# Patient Record
Sex: FEMALE | Race: WHITE | URBAN - NONMETROPOLITAN AREA
[De-identification: names, ages, dates, MRNs, and addresses within clinical notes are randomized per-mention and may not be internally consistent; named-entity substitution may affect disease eponyms.]

---

## 2016-12-08 LAB
CHOLESTEROL, TOTAL: 143 MG/DL
CHOLESTEROL/HDL RATIO: 1.9
GLUCOSE BLD-MCNC: 80 MG/DL
HDLC SERPL-MCNC: 74 MG/DL (ref 35–70)
LDL CHOLESTEROL CALCULATED: 62.2 MG/DL (ref 0–160)
TRIGL SERPL-MCNC: 34 MG/DL
VLDLC SERPL CALC-MCNC: 7 MG/DL

## 2017-05-31 VITALS
DIASTOLIC BLOOD PRESSURE: 68 MMHG | WEIGHT: 125 LBS | HEART RATE: 80 BPM | BODY MASS INDEX: 24.54 KG/M2 | HEIGHT: 60 IN | SYSTOLIC BLOOD PRESSURE: 104 MMHG

## 2017-05-31 DIAGNOSIS — L40.9 PSORIASIS: ICD-10-CM

## 2017-05-31 DIAGNOSIS — N94.3 PMS (PREMENSTRUAL SYNDROME): ICD-10-CM

## 2017-05-31 RX ORDER — FLUOXETINE 10 MG/1
10 CAPSULE ORAL DAILY
COMMUNITY
End: 2017-06-02

## 2017-06-02 ENCOUNTER — OFFICE VISIT (OUTPATIENT)
Dept: FAMILY MEDICINE CLINIC | Age: 42
End: 2017-06-02
Payer: COMMERCIAL

## 2017-06-02 VITALS
BODY MASS INDEX: 24.35 KG/M2 | WEIGHT: 124 LBS | HEART RATE: 80 BPM | SYSTOLIC BLOOD PRESSURE: 104 MMHG | DIASTOLIC BLOOD PRESSURE: 70 MMHG | HEIGHT: 60 IN

## 2017-06-02 DIAGNOSIS — L50.8 URTICARIA, ACUTE: Primary | ICD-10-CM

## 2017-06-02 PROCEDURE — G8420 CALC BMI NORM PARAMETERS: HCPCS | Performed by: FAMILY MEDICINE

## 2017-06-02 PROCEDURE — 99213 OFFICE O/P EST LOW 20 MIN: CPT | Performed by: FAMILY MEDICINE

## 2017-06-02 PROCEDURE — G8427 DOCREV CUR MEDS BY ELIG CLIN: HCPCS | Performed by: FAMILY MEDICINE

## 2017-06-02 PROCEDURE — 1036F TOBACCO NON-USER: CPT | Performed by: FAMILY MEDICINE

## 2017-06-02 RX ORDER — CETIRIZINE HYDROCHLORIDE 10 MG/1
10 TABLET ORAL DAILY
Qty: 30 TABLET | Refills: 5 | Status: SHIPPED | OUTPATIENT
Start: 2017-06-02 | End: 2018-07-13 | Stop reason: SDUPTHER

## 2017-06-02 ASSESSMENT — ENCOUNTER SYMPTOMS
SHORTNESS OF BREATH: 0
EYE PAIN: 0
RHINORRHEA: 0
COUGH: 0

## 2017-06-02 ASSESSMENT — PATIENT HEALTH QUESTIONNAIRE - PHQ9
1. LITTLE INTEREST OR PLEASURE IN DOING THINGS: 1
SUM OF ALL RESPONSES TO PHQ QUESTIONS 1-9: 2
2. FEELING DOWN, DEPRESSED OR HOPELESS: 1
SUM OF ALL RESPONSES TO PHQ9 QUESTIONS 1 & 2: 2

## 2018-02-13 ENCOUNTER — TELEPHONE (OUTPATIENT)
Dept: FAMILY MEDICINE CLINIC | Age: 43
End: 2018-02-13

## 2018-02-15 NOTE — TELEPHONE ENCOUNTER
Does not need a pap test and other health maintanence is up to date. Can do a simple well visit but if she is doing well can delay until next year.

## 2018-07-13 ENCOUNTER — OFFICE VISIT (OUTPATIENT)
Dept: FAMILY MEDICINE CLINIC | Age: 43
End: 2018-07-13
Payer: COMMERCIAL

## 2018-07-13 VITALS
HEART RATE: 79 BPM | SYSTOLIC BLOOD PRESSURE: 120 MMHG | DIASTOLIC BLOOD PRESSURE: 74 MMHG | BODY MASS INDEX: 24.04 KG/M2 | WEIGHT: 122.44 LBS | OXYGEN SATURATION: 98 % | HEIGHT: 60 IN

## 2018-07-13 DIAGNOSIS — H93.12 TINNITUS OF LEFT EAR: ICD-10-CM

## 2018-07-13 DIAGNOSIS — I73.00 RAYNAUD'S PHENOMENON WITHOUT GANGRENE: ICD-10-CM

## 2018-07-13 DIAGNOSIS — N92.0 MENORRHAGIA WITH REGULAR CYCLE: Primary | ICD-10-CM

## 2018-07-13 DIAGNOSIS — J30.89 CHRONIC NONSEASONAL ALLERGIC RHINITIS DUE TO POLLEN: ICD-10-CM

## 2018-07-13 PROCEDURE — 1036F TOBACCO NON-USER: CPT | Performed by: FAMILY MEDICINE

## 2018-07-13 PROCEDURE — G8427 DOCREV CUR MEDS BY ELIG CLIN: HCPCS | Performed by: FAMILY MEDICINE

## 2018-07-13 PROCEDURE — G8420 CALC BMI NORM PARAMETERS: HCPCS | Performed by: FAMILY MEDICINE

## 2018-07-13 PROCEDURE — 99214 OFFICE O/P EST MOD 30 MIN: CPT | Performed by: FAMILY MEDICINE

## 2018-07-13 RX ORDER — CETIRIZINE HYDROCHLORIDE 10 MG/1
10 TABLET ORAL DAILY
Qty: 90 TABLET | Refills: 3 | Status: SHIPPED | OUTPATIENT
Start: 2018-07-13 | End: 2020-01-21 | Stop reason: SDUPTHER

## 2018-07-13 RX ORDER — NAPROXEN 500 MG/1
500 TABLET ORAL 2 TIMES DAILY WITH MEALS
Qty: 60 TABLET | Refills: 5 | Status: SHIPPED | OUTPATIENT
Start: 2018-07-13 | End: 2020-01-21 | Stop reason: SDUPTHER

## 2018-07-13 ASSESSMENT — ENCOUNTER SYMPTOMS
NAUSEA: 0
ABDOMINAL PAIN: 1
CONSTIPATION: 0
DIARRHEA: 0
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
## 2019-06-05 ENCOUNTER — OFFICE VISIT (OUTPATIENT)
Dept: FAMILY MEDICINE CLINIC | Age: 44
End: 2019-06-05
Payer: COMMERCIAL

## 2019-06-05 VITALS
HEART RATE: 78 BPM | WEIGHT: 119 LBS | SYSTOLIC BLOOD PRESSURE: 128 MMHG | BODY MASS INDEX: 23.44 KG/M2 | DIASTOLIC BLOOD PRESSURE: 86 MMHG

## 2019-06-05 DIAGNOSIS — R00.2 HEART PALPITATIONS: ICD-10-CM

## 2019-06-05 DIAGNOSIS — R00.2 PALPITATIONS: Primary | ICD-10-CM

## 2019-06-05 DIAGNOSIS — F41.1 ANXIOUS REACTION: ICD-10-CM

## 2019-06-05 LAB — TSH REFLEX FT4: 4.12 MIU/ML (ref 0.49–4.6)

## 2019-06-05 PROCEDURE — 93000 ELECTROCARDIOGRAM COMPLETE: CPT | Performed by: FAMILY MEDICINE

## 2019-06-05 PROCEDURE — 99214 OFFICE O/P EST MOD 30 MIN: CPT | Performed by: FAMILY MEDICINE

## 2019-06-05 ASSESSMENT — PATIENT HEALTH QUESTIONNAIRE - PHQ9
SUM OF ALL RESPONSES TO PHQ QUESTIONS 1-9: 0
1. LITTLE INTEREST OR PLEASURE IN DOING THINGS: 0
2. FEELING DOWN, DEPRESSED OR HOPELESS: 0
SUM OF ALL RESPONSES TO PHQ9 QUESTIONS 1 & 2: 0
SUM OF ALL RESPONSES TO PHQ QUESTIONS 1-9: 0

## 2019-06-05 NOTE — PROGRESS NOTES
[address] [address] [address]  Dept: [phone]  Dept AOB:[phone]    Jon Ching is a 37 y.o. female who presents today for her medical conditions/complaints as notedbelow. Jon Ching is c/o of Other (not sleeping well) and Palpitations (beating hard, feels like a flutter, happens 2-3 times in a row, when she takes a few deep breathes it makes it feel better, happens at no particular time)      HPI:     Felt like something constantly in her throat all the time, felt like her heart was beating really hard, like she would have to hold onto something to steady herself, did not really sleep well, did not eat well either. Palpitations    This is a new problem. The problem has been gradually improving (since she made the appt has been). Nothing aggravates the symptoms. Pertinent negatives include no anxiety. 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 In the last week has started to feel better, starting to want to do more things again-- thinks the stress settling down a bit has helped with this some. BP Readings from Last 3 Encounters:   06/05/19 128/86   07/13/18 120/74   06/02/17 104/70          (goal 120/80)    Wt Readings from Last 3 Encounters:   06/05/19 119 lb (54 kg)   07/13/18 122 lb 7 oz (55.5 kg)   06/02/17 124 lb (56.2 kg)        Past Medical History:   Diagnosis Date    Psoriasis     usually only on her forehead      No past surgical history on file.     Family History   Problem Relation Age of Onset    High Blood Pressure Mother     Other Mother         \"fluttering heart rate\" - on meds    Coronary Art Dis Father         quad bypass age 36, double bypass several years ago   Jennifer Tate Diabetes Father     Diabetes Paternal Aunt         with multiple comorbidities    High Cholesterol Other     Heart Disease Other         pericarditis       Social History     Tobacco Use    Smoking status: Never Smoker    Smokeless tobacco: Never Used   Substance Use Topics    Alcohol use: No      Current Outpatient Medications   Medication Sig Dispense Refill    cetirizine (TH CETIRIZINE HCL) 10 MG tablet Take 1 tablet by mouth daily 90 tablet 3    naproxen (NAPROSYN) 500 MG tablet Take 1 tablet by mouth 2 times daily (with meals) Take one po BID. Take with food. 60 tablet 5     No current facility-administered medications for this visit. No Known Allergies    Health Maintenance   Topic Date Due    HIV screen  12/07/1988    Flu vaccine (Season Ended) 09/01/2019    Cervical cancer screen  12/02/2019    DTaP/Tdap/Td vaccine (2 - Td) 02/03/2021    Lipid screen  04/21/2023    Pneumococcal 0-64 years Vaccine  Aged Out       Subjective:      Review of Systems   Cardiovascular: Positive for palpitations. Psychiatric/Behavioral: The patient is not nervous/anxious. Objective:     /86 (Site: Right Upper Arm, Position: Sitting, Cuff Size: Medium Adult)   Pulse 78   Wt 119 lb (54 kg)   BMI 23.44 kg/m²     Physical Exam   Constitutional: She is oriented to person, place, and time. She appears well-developed and well-nourished. HENT:   Head: Normocephalic and atraumatic. Eyes: Conjunctivae and EOM are normal.   Neck: Normal range of motion. Neck supple. No JVD present. No thyromegaly present. Cardiovascular: Normal rate, regular rhythm and intact distal pulses. Exam reveals no gallop and no friction rub. No murmur heard. Pulmonary/Chest: Effort normal and breath sounds normal. No respiratory distress. Abdominal: Soft. She exhibits no distension. There is no tenderness. Musculoskeletal: She exhibits no edema. Lymphadenopathy:     She has no cervical adenopathy. Neurological: She is alert and oriented to person, place, and time. No cranial nerve deficit. Skin: Skin is warm.  Capillary refill takes less than 2 seconds. Psychiatric: She has a normal mood and affect. Her behavior is normal. Judgment and thought content normal.   Nursing note and vitals reviewed. Assessment/Plan:      Diagnosis Orders   1. Palpitations  EKG 12 Lead- EKG is normal today, sx are suggestive of anxiety rather than cardiac disease. 2. Anxious reaction  reasssured and discussed medications if sx are not improving with the stress decreasing. Will let us know if would like to try. In the interim would check the thyroid   3. Heart palpitations           Lab Results   Component Value Date    CHOL 143 12/08/2016    TRIG 34 12/08/2016    HDL 74 (A) 12/08/2016       Return for As scheduled. Patient given educational materials - see patientinstructions. Discussed use, benefit, and side effects of prescribed medications. All patient questions answered. Pt voiced understanding. Reviewed health maintenance. Instructed to continue current medications, diet andexercise. Patient agreed with treatment plan. Follow up as directed.      Electronically signed by Kei Boles MD on 6/9/2019

## 2019-09-24 ENCOUNTER — PATIENT MESSAGE (OUTPATIENT)
Dept: FAMILY MEDICINE CLINIC | Age: 44
End: 2019-09-24

## 2020-02-26 ENCOUNTER — OFFICE VISIT (OUTPATIENT)
Dept: FAMILY MEDICINE CLINIC | Age: 45
End: 2020-02-26
Payer: COMMERCIAL

## 2020-02-26 VITALS
SYSTOLIC BLOOD PRESSURE: 120 MMHG | WEIGHT: 124 LBS | HEART RATE: 90 BPM | DIASTOLIC BLOOD PRESSURE: 78 MMHG | BODY MASS INDEX: 25 KG/M2 | OXYGEN SATURATION: 99 % | HEIGHT: 59 IN

## 2020-02-26 PROCEDURE — 99213 OFFICE O/P EST LOW 20 MIN: CPT | Performed by: FAMILY MEDICINE

## 2020-02-26 ASSESSMENT — PATIENT HEALTH QUESTIONNAIRE - PHQ9
1. LITTLE INTEREST OR PLEASURE IN DOING THINGS: 0
2. FEELING DOWN, DEPRESSED OR HOPELESS: 0
SUM OF ALL RESPONSES TO PHQ9 QUESTIONS 1 & 2: 0
SUM OF ALL RESPONSES TO PHQ QUESTIONS 1-9: 0
SUM OF ALL RESPONSES TO PHQ QUESTIONS 1-9: 0

## 2020-02-26 ASSESSMENT — ENCOUNTER SYMPTOMS: VOMITING: 0

## 2020-02-26 NOTE — PROGRESS NOTES
Appearance: She is well-developed. HENT:      Head: Normocephalic and atraumatic. Eyes:      Conjunctiva/sclera: Conjunctivae normal.   Neck:      Musculoskeletal: Normal range of motion and neck supple. Thyroid: No thyromegaly. Vascular: No JVD. Cardiovascular:      Rate and Rhythm: Normal rate and regular rhythm. Heart sounds: Normal heart sounds. No murmur. No friction rub. No gallop. Pulmonary:      Effort: Pulmonary effort is normal. No respiratory distress. Breath sounds: Normal breath sounds. Abdominal:      General: Bowel sounds are normal.      Palpations: Abdomen is soft. There is no mass. Tenderness: There is no abdominal tenderness. There is no guarding or rebound. Genitourinary:     Exam position: Supine. Pubic Area: No rash. Labia:         Right: No rash or tenderness. Left: No rash or tenderness. Vagina: Normal. No signs of injury and foreign body. No vaginal discharge, erythema, tenderness, bleeding, lesions or prolapsed vaginal walls. Cervix: No cervical motion tenderness or discharge. Uterus: Normal.    Lymphadenopathy:      Cervical: No cervical adenopathy. Lower Body: No right inguinal adenopathy. No left inguinal adenopathy. Skin:     General: Skin is warm. Findings: No rash. Neurological:      Mental Status: She is alert and oriented to person, place, and time. Psychiatric:         Mood and Affect: Mood normal.         Behavior: Behavior normal.         Thought Content: Thought content normal.         Judgment: Judgment normal.         Assessment/Plan:      Diagnosis Orders   1. Abnormal vaginal bleeding in premenopausal patient  US NON OB TRANSVAGINAL     Will go ahead and check the ultrasound. If this is normal then we can monitor this. If it does show a thickened lining then I would recommend an endometrial biopsy.   Bleeding pattern sounds like it is likely related to ovulation and her hormone changes

## 2020-03-10 ENCOUNTER — TELEPHONE (OUTPATIENT)
Dept: FAMILY MEDICINE CLINIC | Age: 45
End: 2020-03-10

## 2020-03-11 ENCOUNTER — PATIENT MESSAGE (OUTPATIENT)
Dept: FAMILY MEDICINE CLINIC | Age: 45
End: 2020-03-11

## 2020-03-11 NOTE — TELEPHONE ENCOUNTER
Spoke with patient-- would like to scheduled the endometrial biuopsy in the office. Please schedule.

## 2020-03-12 NOTE — TELEPHONE ENCOUNTER
From: Raya Martinez  To: Claudette Chavez MD  Sent: 3/11/2020 5:19 AM EDT  Subject: Visit Follow-Up Question    Hi,    At my last visit, my height was recorded incorrectly. My height is 4 foot 11 inches. I was wondering if someone could update that? Thanks!   Raya Martinez

## 2020-03-13 ENCOUNTER — PROCEDURE VISIT (OUTPATIENT)
Dept: FAMILY MEDICINE CLINIC | Age: 45
End: 2020-03-13
Payer: COMMERCIAL

## 2020-03-13 ENCOUNTER — HOSPITAL ENCOUNTER (OUTPATIENT)
Age: 45
Setting detail: SPECIMEN
Discharge: HOME | End: 2020-03-13
Payer: COMMERCIAL

## 2020-03-13 VITALS
DIASTOLIC BLOOD PRESSURE: 110 MMHG | BODY MASS INDEX: 25.25 KG/M2 | HEART RATE: 82 BPM | OXYGEN SATURATION: 99 % | WEIGHT: 125 LBS | SYSTOLIC BLOOD PRESSURE: 160 MMHG

## 2020-03-13 PROCEDURE — G0145 SCR C/V CYTO,THINLAYER,RESCR: HCPCS

## 2020-03-13 PROCEDURE — 58100 BIOPSY OF UTERUS LINING: CPT | Performed by: FAMILY MEDICINE

## 2020-03-13 PROCEDURE — 99213 OFFICE O/P EST LOW 20 MIN: CPT | Performed by: FAMILY MEDICINE

## 2020-03-13 PROCEDURE — P3000 SCREEN PAP BY TECH W MD SUPV: HCPCS | Performed by: FAMILY MEDICINE

## 2020-03-13 PROCEDURE — 87624 HPV HI-RISK TYP POOLED RSLT: CPT

## 2020-03-19 ENCOUNTER — TELEPHONE (OUTPATIENT)
Dept: FAMILY MEDICINE CLINIC | Age: 45
End: 2020-03-19

## 2020-03-19 NOTE — TELEPHONE ENCOUNTER
Please let Cindy Montemayor know that her endometrial biopsy was negative there was no signs of anything worrisome. It showed normal endometrium meaning the normal lining for a woman who is having periods. We do not need to do anything else at this time other than watch her bleeding. If the bleeding is getting to where she feels that she really wants to or needs to do something about it due to the annoyance then I can refer her to GYN to discuss whether she is a candidate for an endometrial ablation.   Have her let me now

## 2020-03-24 LAB — CYTOLOGY REPORT: NORMAL

## 2020-03-25 LAB
HPV SAMPLE: NORMAL
HPV, GENOTYPE 16: NOT DETECTED
HPV, GENOTYPE 18: NOT DETECTED
HPV, HIGH RISK OTHER: NOT DETECTED
HPV, INTERPRETATION: NORMAL
SPECIMEN DESCRIPTION: NORMAL

## 2021-04-06 DIAGNOSIS — J30.89 CHRONIC NONSEASONAL ALLERGIC RHINITIS DUE TO POLLEN: ICD-10-CM

## 2021-04-06 RX ORDER — CETIRIZINE HYDROCHLORIDE 10 MG/1
10 TABLET ORAL DAILY
Qty: 90 TABLET | Refills: 3 | Status: SHIPPED | OUTPATIENT
Start: 2021-04-06 | End: 2022-06-22 | Stop reason: SDUPTHER

## 2021-04-06 NOTE — TELEPHONE ENCOUNTER
Terry Thruston is requesting a refill on the following medication(s):  Requested Prescriptions     Pending Prescriptions Disp Refills    cetirizine (ZYRTEC) 10 MG tablet 90 tablet 3     Sig: Take 1 tablet by mouth daily       Last Visit Date (If Applicable):  1/34/4486    Next Visit Date:    Visit date not found

## 2021-11-22 ENCOUNTER — OFFICE VISIT (OUTPATIENT)
Dept: FAMILY MEDICINE CLINIC | Age: 46
End: 2021-11-22
Payer: COMMERCIAL

## 2021-11-22 VITALS
HEIGHT: 59 IN | BODY MASS INDEX: 27.62 KG/M2 | HEART RATE: 90 BPM | RESPIRATION RATE: 20 BRPM | SYSTOLIC BLOOD PRESSURE: 110 MMHG | WEIGHT: 137 LBS | DIASTOLIC BLOOD PRESSURE: 72 MMHG | OXYGEN SATURATION: 98 %

## 2021-11-22 DIAGNOSIS — Z00.01 ENCOUNTER FOR WELL ADULT EXAM WITH ABNORMAL FINDINGS: ICD-10-CM

## 2021-11-22 DIAGNOSIS — Z13.1 SCREENING FOR DIABETES MELLITUS: ICD-10-CM

## 2021-11-22 DIAGNOSIS — Z23 NEED FOR PROPHYLACTIC VACCINATION WITH TETANUS-DIPHTHERIA (TD): ICD-10-CM

## 2021-11-22 DIAGNOSIS — N92.1 MENORRHAGIA WITH IRREGULAR CYCLE: ICD-10-CM

## 2021-11-22 DIAGNOSIS — Z11.4 SCREENING FOR HIV WITHOUT PRESENCE OF RISK FACTORS: ICD-10-CM

## 2021-11-22 DIAGNOSIS — Z12.11 SCREEN FOR COLON CANCER: ICD-10-CM

## 2021-11-22 DIAGNOSIS — Z13.220 LIPID SCREENING: ICD-10-CM

## 2021-11-22 DIAGNOSIS — Z72.89 OTHER PROBLEMS RELATED TO LIFESTYLE: ICD-10-CM

## 2021-11-22 DIAGNOSIS — F32.81 PMDD (PREMENSTRUAL DYSPHORIC DISORDER): ICD-10-CM

## 2021-11-22 PROCEDURE — 99214 OFFICE O/P EST MOD 30 MIN: CPT | Performed by: FAMILY MEDICINE

## 2021-11-22 RX ORDER — FLUOXETINE HYDROCHLORIDE 20 MG/1
20 CAPSULE ORAL DAILY
Qty: 30 CAPSULE | Refills: 1 | Status: SHIPPED | OUTPATIENT
Start: 2021-11-22 | End: 2022-01-24 | Stop reason: SDUPTHER

## 2021-11-22 RX ORDER — ONDANSETRON 4 MG/1
TABLET, FILM COATED ORAL
Qty: 20 TABLET | Refills: 0 | Status: SHIPPED | OUTPATIENT
Start: 2021-11-22

## 2021-11-22 SDOH — ECONOMIC STABILITY: FOOD INSECURITY: WITHIN THE PAST 12 MONTHS, YOU WORRIED THAT YOUR FOOD WOULD RUN OUT BEFORE YOU GOT MONEY TO BUY MORE.: NEVER TRUE

## 2021-11-22 SDOH — ECONOMIC STABILITY: FOOD INSECURITY: WITHIN THE PAST 12 MONTHS, THE FOOD YOU BOUGHT JUST DIDN'T LAST AND YOU DIDN'T HAVE MONEY TO GET MORE.: NEVER TRUE

## 2021-11-22 ASSESSMENT — PATIENT HEALTH QUESTIONNAIRE - PHQ9
SUM OF ALL RESPONSES TO PHQ QUESTIONS 1-9: 0
2. FEELING DOWN, DEPRESSED OR HOPELESS: 0
SUM OF ALL RESPONSES TO PHQ9 QUESTIONS 1 & 2: 0
1. LITTLE INTEREST OR PLEASURE IN DOING THINGS: 0
SUM OF ALL RESPONSES TO PHQ QUESTIONS 1-9: 0
SUM OF ALL RESPONSES TO PHQ QUESTIONS 1-9: 0

## 2021-11-22 ASSESSMENT — SOCIAL DETERMINANTS OF HEALTH (SDOH): HOW HARD IS IT FOR YOU TO PAY FOR THE VERY BASICS LIKE FOOD, HOUSING, MEDICAL CARE, AND HEATING?: NOT HARD AT ALL

## 2021-11-22 NOTE — PATIENT INSTRUCTIONS
Can try the chasteberry over the counter for the perimenopause symptoms. Try the fluoxetine daily for the irritability and the sleep disturbance. Continue with the naproxen regularly for the cramping. Could consider and IUD if needed for the bleeding and cramping in the future. Can try the zofran for the nausea as needed.

## 2021-11-22 NOTE — PROGRESS NOTES
[address] [facility]  [address]  Dept: [phone]  Dept [facility] [place]:[phone]    Terri Wills is a 50 y.o. female who presents today for her medical conditions/complaints as notedbelow. Terri Wills is c/o of Menopause (premenopause, in last couple years she has had PMS and its gotten worse )      HPI:     HPI    Hs had the intermittent PMS sx and this has increased over the years. Cramps even when she was in high school would pass out with the cramps. Now getting worse the first day or two of the menstrual cycle will feel nauseated. Is grumpy and irritable the week before her cycle now. ahs had some clotting in the last day or two of the cycle now also. Not real large. Bleeds 4-5 days per month. Still monthly, 21- 31 days apart. Uses the regular tampons changes every 3 hours then can go every 4 hours or so. With the pad will change every 5-6 hours. Sleeps well at times and not well at other times. Sometimes wiLl wake up between 1:30-3:30. USUALLY 6 HOURS -- USED TO GET MORE LIKE 7-8 HOURS. Will have some hot flashes in her period week usually in the last few months karlos. BP Readings from Last 3 Encounters:   11/22/21 110/72   03/13/20 (!) 160/110   02/26/20 120/78          (goal 120/80)    Wt Readings from Last 3 Encounters:   11/22/21 137 lb (62.1 kg)   03/13/20 125 lb (56.7 kg)   02/26/20 124 lb (56.2 kg)        Past Medical History:   Diagnosis Date    Psoriasis     usually only on her forehead      No past surgical history on file.     Family History   Problem Relation Age of Onset    High Blood Pressure Mother     Other Mother         \"fluttering heart rate\" - on meds    Coronary Art Dis Father         quad bypass age 36, double bypass several years ago   [place] Diabetes Father     Diabetes Paternal Aunt         with multiple comorbidities    High Cholesterol Other     Heart Disease Other         pericarditis       Social History     Tobacco Use    Smoking status: Never Smoker    Smokeless tobacco: Never Used   Substance Use Topics    Alcohol use: No      Current Outpatient Medications   Medication Sig Dispense Refill    ondansetron (ZOFRAN) 4 MG tablet Every 8 hours as needed for nausea 20 tablet 0    FLUoxetine (PROZAC) 20 MG capsule Take 1 capsule by mouth daily 30 capsule 1    cetirizine (ZYRTEC) 10 MG tablet Take 1 tablet by mouth daily 90 tablet 3    naproxen (NAPROSYN) 500 MG tablet Take 1 tablet by mouth 2 times daily (with meals) Take one po BID. Take with food. 60 tablet 5     No current facility-administered medications for this visit. No Known Allergies    Health Maintenance   Topic Date Due    Diabetes screen  Never done    Colon cancer screen colonoscopy  Never done    Cervical cancer screen  03/13/2025    Lipid screen  11/24/2026    DTaP/Tdap/Td vaccine (3 - Td or Tdap) 11/23/2031    Flu vaccine  Completed    COVID-19 Vaccine  Completed    Hepatitis C screen  Completed    HIV screen  Completed    Hepatitis A vaccine  Aged Out    Hepatitis B vaccine  Aged Out    Hib vaccine  Aged Out    Meningococcal (ACWY) vaccine  Aged Out    Pneumococcal 0-64 years Vaccine  Aged Out       Subjective:      Review of Systems    Objective:     /72 (Site: Left Upper Arm, Position: Sitting, Cuff Size: Large Adult)   Pulse 90   Resp 20   Ht 4' 11\" (1.499 m)   Wt 137 lb (62.1 kg)   SpO2 98%   BMI 27.67 kg/m²     Physical Exam  Vitals and nursing note reviewed. Constitutional:       Appearance: Normal appearance. She is well-developed. HENT:      Head: Normocephalic and atraumatic. Eyes:      Conjunctiva/sclera: Conjunctivae normal.   Neck:      Thyroid: No thyromegaly. Vascular: No JVD. Cardiovascular:      Rate and Rhythm: Normal rate and regular rhythm. Heart sounds: Normal heart sounds. No murmur heard. No friction rub. No gallop.     Pulmonary:      Effort: Pulmonary effort is normal. No respiratory distress. Breath sounds: Normal breath sounds. Abdominal:      Palpations: Abdomen is soft. Musculoskeletal:      Cervical back: Normal range of motion and neck supple. Right lower leg: No edema. Left lower leg: No edema. Lymphadenopathy:      Cervical: No cervical adenopathy. Skin:     General: Skin is warm. Neurological:      General: No focal deficit present. Mental Status: She is alert and oriented to person, place, and time. Psychiatric:         Mood and Affect: Mood normal.         Behavior: Behavior normal.         Thought Content: Thought content normal.         Judgment: Judgment normal.         Assessment/Plan:     1. Encounter for well adult exam with abnormal findings  2. PMDD (premenstrual dysphoric disorder)  -     ondansetron (ZOFRAN) 4 MG tablet; Every 8 hours as needed for nausea, Disp-20 tablet, R-0Normal  -     FLUoxetine (PROZAC) 20 MG capsule; Take 1 capsule by mouth daily, Disp-30 capsule, R-1Normal  3. Menorrhagia with irregular cycle  4. Lipid screening  5. Screening for diabetes mellitus  6. Screen for colon cancer  7. Need for prophylactic vaccination with tetanus-diphtheria (Td)  -     Tdap (age 6y and older) IM (Boostrix)  8. Other problems related to lifestyle  -     Hepatitis C Antibody; Future  9. Screening for HIV without presence of risk factors  -     HIV Screen; Future      Can try the chasteberry over the counter for the perimenopause symptoms. Try the fluoxetine daily for the irritability and the sleep disturbance. Continue with the naproxen regularly for the cramping. Could consider an IUD if needed for the bleeding and cramping in the future. Can try the zofran for the nausea as needed.      Lab Results   Component Value Date    WBC 7.3 11/24/2021    HGB 14.5 11/24/2021    HCT 40.4 11/24/2021    .6 11/24/2021    CHOL 176 11/24/2021    TRIG 39 11/24/2021    HDL 88 (H) 11/24/2021       Return in about 2 months (around 1/22/2022). Patient given educational materials - see patientinstructions. Discussed use, benefit, and side effects of prescribed medications. All patient questions answered. Pt voiced understanding. Reviewed health maintenance. Instructed to continue current medications, diet andexercise. Patient agreed with treatment plan. Follow up as directed.      (Please note that portions of this note were completed with a voice-recognition program. Efforts were made to edit the dictation but occasionally words are mis-transcribed.)    Electronically signed by Darwin Calvo MD on 11/28/2021

## 2021-11-23 DIAGNOSIS — H93.12 TINNITUS OF LEFT EAR: ICD-10-CM

## 2021-11-23 PROCEDURE — 90471 IMMUNIZATION ADMIN: CPT | Performed by: FAMILY MEDICINE

## 2021-11-23 PROCEDURE — 90715 TDAP VACCINE 7 YRS/> IM: CPT | Performed by: FAMILY MEDICINE

## 2021-11-23 NOTE — TELEPHONE ENCOUNTER
Blanche Sheffield is requesting a refill on the following medication(s):  Requested Prescriptions     Pending Prescriptions Disp Refills    naproxen (NAPROSYN) 500 MG tablet 60 tablet 5     Sig: Take 1 tablet by mouth 2 times daily (with meals) Take one po BID. Take with food.        Last Visit Date (If Applicable):  81/73/8575    Next Visit Date:    1/24/2022

## 2021-11-24 LAB
BASOPHILS %: 1.44 (ref 0–3)
BASOPHILS ABSOLUTE: 0.1 (ref 0–0.3)
CHOLESTEROL/HDL RATIO: 2 RATIO (ref 0–4.5)
CHOLESTEROL: 176 MG/DL (ref 50–200)
EOSINOPHILS %: 1.76 (ref 0–10)
EOSINOPHILS ABSOLUTE: 0.13 (ref 0–1.1)
HCT VFR BLD CALC: 40.4 % (ref 37–47)
HDLC SERPL-MCNC: 88 MG/DL (ref 36–68)
HEMOGLOBIN: 14.5 (ref 12–16)
HEPATITIS C ANTIBODY: NONREACTIVE
HIV AG/AB: NONREACTIVE
LDL CHOLESTEROL CALCULATED: 80.2 MG/DL (ref 0–160)
LYMPHOCYTE %: 18.73 (ref 20–51.1)
LYMPHOCYTES ABSOLUTE: 1.36 (ref 1–5.5)
MCH RBC QN AUTO: 30.2 PG (ref 28.5–32.5)
MCHC RBC AUTO-ENTMCNC: 35.8 G/DL (ref 32–37)
MCV RBC AUTO: 84.3 FL (ref 80–94)
MONOCYTES %: 6.63 (ref 1.7–9.3)
MONOCYTES ABSOLUTE: 0.48 (ref 0.1–1)
NEUTROPHILS %: 71.44 (ref 42.2–75.2)
NEUTROPHILS ABSOLUTE: 5.18 (ref 2–8.1)
PDW BLD-RTO: 10.9 % (ref 8.5–15.5)
PLATELET # BLD: 296.6 THOU/MM3 (ref 130–400)
RBC: 4.8 M/UL (ref 4.2–5.4)
TRIGL SERPL-MCNC: 39 MG/DL (ref 10–250)
TSH REFLEX FT4: 4.14 MIU/ML (ref 0.49–4.67)
VLDLC SERPL CALC-MCNC: 8 MG/DL (ref 0–50)
WBC: 7.3 THOU/ML3 (ref 4.8–10.8)

## 2021-11-25 RX ORDER — NAPROXEN 500 MG/1
500 TABLET ORAL 2 TIMES DAILY WITH MEALS
Qty: 60 TABLET | Refills: 5 | Status: SHIPPED | OUTPATIENT
Start: 2021-11-25 | End: 2022-09-30

## 2022-01-24 ENCOUNTER — OFFICE VISIT (OUTPATIENT)
Dept: FAMILY MEDICINE CLINIC | Age: 47
End: 2022-01-24
Payer: COMMERCIAL

## 2022-01-24 VITALS
DIASTOLIC BLOOD PRESSURE: 86 MMHG | SYSTOLIC BLOOD PRESSURE: 126 MMHG | HEART RATE: 83 BPM | OXYGEN SATURATION: 99 % | WEIGHT: 138 LBS | BODY MASS INDEX: 27.87 KG/M2

## 2022-01-24 DIAGNOSIS — N92.1 MENORRHAGIA WITH IRREGULAR CYCLE: Primary | ICD-10-CM

## 2022-01-24 DIAGNOSIS — F32.81 PMDD (PREMENSTRUAL DYSPHORIC DISORDER): ICD-10-CM

## 2022-01-24 PROCEDURE — 99213 OFFICE O/P EST LOW 20 MIN: CPT | Performed by: FAMILY MEDICINE

## 2022-01-24 RX ORDER — FLUOXETINE HYDROCHLORIDE 20 MG/1
20 CAPSULE ORAL DAILY
Qty: 30 CAPSULE | Refills: 5 | Status: SHIPPED | OUTPATIENT
Start: 2022-01-24 | End: 2022-09-30

## 2022-01-24 NOTE — PROGRESS NOTES
[address] [facility]  [address]  Dept: [phone]  Dept BSN:[phone]    Angel Soriano is a 48 y.o. female who presents today for her medical conditions/complaints as notedbelow. Angel Soriano is c/o of Premenstrual Syndrome (follow up after starting prozac- she only takes it the week prior to her period. She has noticed improvement in her mood. )      HPI:     HPI    The mood is much better, more level with the fluoxetine. 810 days before her cyce and this has really leveled things out for her    Has been taking the chasteberry and has not really thought about the hot flashes so thinks it helps. The cramps are a bit better than they were, still there but not as intense. Tylenol and naproxen are managing it. BP Readings from Last 3 Encounters:   01/24/22 126/86   11/22/21 110/72   03/13/20 (!) 160/110          (goal 120/80)    Wt Readings from Last 3 Encounters:   01/24/22 138 lb (62.6 kg)   11/22/21 137 lb (62.1 kg)   03/13/20 125 lb (56.7 kg)        Past Medical History:   Diagnosis Date    Psoriasis     usually only on her forehead      History reviewed. No pertinent surgical history.     Family History   Problem Relation Age of Onset    High Blood Pressure Mother     Other Mother         \"fluttering heart rate\" - on meds    Coronary Art Dis Father         quad bypass age 36, double bypass several years ago   [facility] Diabetes Father     Diabetes Paternal Aunt         with multiple comorbidities    High Cholesterol Other     Heart Disease Other         pericarditis       Social History     Tobacco Use    Smoking status: Never Smoker    Smokeless tobacco: Never Used   Substance Use Topics    Alcohol use: No      Current Outpatient Medications   Medication Sig Dispense Refill    FLUoxetine (PROZAC) 20 MG capsule Take 1 capsule by mouth daily 30 capsule 5    naproxen (NAPROSYN) 500 MG tablet Take 1 tablet by mouth 2 times daily (with meals) Take one po BID. Take with food. 60 tablet 5    ondansetron (ZOFRAN) 4 MG tablet Every 8 hours as needed for nausea 20 tablet 0    cetirizine (ZYRTEC) 10 MG tablet Take 1 tablet by mouth daily 90 tablet 3     No current facility-administered medications for this visit. No Known Allergies    Health Maintenance   Topic Date Due    Diabetes screen  Never done    Depression Monitoring  11/22/2022    Colon cancer screen fecal DNA test (Cologuard)  12/30/2024    Cervical cancer screen  03/13/2025    Lipid screen  11/24/2026    DTaP/Tdap/Td vaccine (3 - Td or Tdap) 11/23/2031    Flu vaccine  Completed    COVID-19 Vaccine  Completed    Hepatitis C screen  Completed    HIV screen  Completed    Hepatitis A vaccine  Aged Out    Hepatitis B vaccine  Aged Out    Hib vaccine  Aged Out    Meningococcal (ACWY) vaccine  Aged Out    Pneumococcal 0-64 years Vaccine  Aged Out       Subjective:      Review of Systems    Objective:     /86 (Site: Left Upper Arm, Position: Sitting, Cuff Size: Medium Adult)   Pulse 83   Wt 138 lb (62.6 kg)   SpO2 99%   BMI 27.87 kg/m²     Physical Exam  Vitals and nursing note reviewed. Constitutional:       Appearance: Normal appearance. HENT:      Head: Normocephalic. Nose: Nose normal.      Mouth/Throat:      Mouth: Mucous membranes are moist.   Cardiovascular:      Rate and Rhythm: Normal rate and regular rhythm. Pulses: Normal pulses. Heart sounds: Normal heart sounds. Pulmonary:      Effort: Pulmonary effort is normal.      Breath sounds: Normal breath sounds. Musculoskeletal:      Cervical back: Neck supple. No tenderness. Right lower leg: No edema. Left lower leg: No edema. Lymphadenopathy:      Cervical: No cervical adenopathy. Neurological:      General: No focal deficit present. Mental Status: She is alert and oriented to person, place, and time.    Psychiatric:         Mood and Affect: Mood normal. Behavior: Behavior normal.         Thought Content: Thought content normal.         Judgment: Judgment normal.         Assessment/Plan:     1. Menorrhagia with irregular cycle  2. PMDD (premenstrual dysphoric disorder)  -     FLUoxetine (PROZAC) 20 MG capsule; Take 1 capsule by mouth daily, Disp-30 capsule, R-5Normal     symptoms seem to be very well controlled with her current medication management. No changes and continue with the fluoxetine 10 days out of the month this is controlling her symptoms. Could increase to daily if she does need this. Lab Results   Component Value Date    WBC 7.3 11/24/2021    HGB 14.5 11/24/2021    HCT 40.4 11/24/2021    .6 11/24/2021    CHOL 176 11/24/2021    TRIG 39 11/24/2021    HDL 88 (H) 11/24/2021       Return in about 10 months (around 12/6/2022). Multiple labs and other testing may have been ordered which may not be completely evident from the above note due to system interface incompatibilities. Patient given educational materials - see patientinstructions. Discussed use, benefit, and side effects of prescribed medications. All patient questions answered. Pt voiced understanding. Reviewed health maintenance. Instructed to continue current medications, diet andexercise. Patient agreed with treatment plan. Follow up as directed.      (Please note that portions of this note were completed with a voice-recognition program. Efforts were made to edit the dictation but occasionally words are mis-transcribed.)    Electronically signed by Quentin Zimmer MD on 1/24/2022

## 2022-06-22 DIAGNOSIS — J30.89 CHRONIC NONSEASONAL ALLERGIC RHINITIS DUE TO POLLEN: ICD-10-CM

## 2022-06-22 RX ORDER — CETIRIZINE HYDROCHLORIDE 10 MG/1
10 TABLET ORAL DAILY
Qty: 90 TABLET | Refills: 3 | Status: SHIPPED | OUTPATIENT
Start: 2022-06-22 | End: 2022-08-18

## 2022-06-22 NOTE — TELEPHONE ENCOUNTER
Adilene Carvajal is requesting a refill on the following medication(s):  Requested Prescriptions     Pending Prescriptions Disp Refills    cetirizine (ZYRTEC) 10 MG tablet [Pharmacy Med Name: cetirizine 10 mg tablet] 90 tablet 3     Sig: TAKE ONE TABLET BY MOUTH ONCE DAILY       Last Visit Date (If Applicable):  6/94/0418    Next Visit Date:    12/12/2022

## 2022-06-22 NOTE — TELEPHONE ENCOUNTER
Neri Leslie is requesting a refill on the following medication(s):  Requested Prescriptions     Pending Prescriptions Disp Refills    cetirizine (ZYRTEC) 10 MG tablet 90 tablet 3     Sig: Take 1 tablet by mouth daily       Last Visit Date (If Applicable):  4/90/6476    Next Visit Date:    12/12/2022

## 2022-06-23 RX ORDER — CETIRIZINE HYDROCHLORIDE 10 MG/1
TABLET ORAL
Qty: 90 TABLET | Refills: 3 | Status: SHIPPED | OUTPATIENT
Start: 2022-06-23

## 2022-08-18 ENCOUNTER — PATIENT MESSAGE (OUTPATIENT)
Dept: FAMILY MEDICINE CLINIC | Age: 47
End: 2022-08-18

## 2022-08-18 ENCOUNTER — TELEMEDICINE (OUTPATIENT)
Dept: FAMILY MEDICINE CLINIC | Age: 47
End: 2022-08-18
Payer: COMMERCIAL

## 2022-08-18 DIAGNOSIS — U07.1 COVID: Primary | ICD-10-CM

## 2022-08-18 PROCEDURE — 99213 OFFICE O/P EST LOW 20 MIN: CPT | Performed by: FAMILY MEDICINE

## 2022-08-18 RX ORDER — GUAIFENESIN 600 MG/1
1200 TABLET, EXTENDED RELEASE ORAL 2 TIMES DAILY
COMMUNITY

## 2022-08-18 ASSESSMENT — PATIENT HEALTH QUESTIONNAIRE - PHQ9
4. FEELING TIRED OR HAVING LITTLE ENERGY: 1
SUM OF ALL RESPONSES TO PHQ QUESTIONS 1-9: 2
3. TROUBLE FALLING OR STAYING ASLEEP: 1
7. TROUBLE CONCENTRATING ON THINGS, SUCH AS READING THE NEWSPAPER OR WATCHING TELEVISION: 0
10. IF YOU CHECKED OFF ANY PROBLEMS, HOW DIFFICULT HAVE THESE PROBLEMS MADE IT FOR YOU TO DO YOUR WORK, TAKE CARE OF THINGS AT HOME, OR GET ALONG WITH OTHER PEOPLE: 0
5. POOR APPETITE OR OVEREATING: 0
1. LITTLE INTEREST OR PLEASURE IN DOING THINGS: 0
6. FEELING BAD ABOUT YOURSELF - OR THAT YOU ARE A FAILURE OR HAVE LET YOURSELF OR YOUR FAMILY DOWN: 0
8. MOVING OR SPEAKING SO SLOWLY THAT OTHER PEOPLE COULD HAVE NOTICED. OR THE OPPOSITE, BEING SO FIGETY OR RESTLESS THAT YOU HAVE BEEN MOVING AROUND A LOT MORE THAN USUAL: 0
SUM OF ALL RESPONSES TO PHQ QUESTIONS 1-9: 2
2. FEELING DOWN, DEPRESSED OR HOPELESS: 0
SUM OF ALL RESPONSES TO PHQ QUESTIONS 1-9: 2
9. THOUGHTS THAT YOU WOULD BE BETTER OFF DEAD, OR OF HURTING YOURSELF: 0
SUM OF ALL RESPONSES TO PHQ9 QUESTIONS 1 & 2: 0
SUM OF ALL RESPONSES TO PHQ QUESTIONS 1-9: 2

## 2022-08-18 ASSESSMENT — ENCOUNTER SYMPTOMS
NAUSEA: 0
COUGH: 1
SWOLLEN GLANDS: 0
CHANGE IN BOWEL HABIT: 0
SORE THROAT: 1
VOMITING: 0
ABDOMINAL PAIN: 0

## 2022-08-18 NOTE — TELEPHONE ENCOUNTER
From: Ivania Root  To: Dr. Delmis Cordova: 8/18/2022 6:11 AM EDT  Subject: Positive     Good morning Dr. Janice Gleason. I found out last night that I was a close contact to 2 positive covid cases. I did a home test last night and it was positive. So far my symptoms are manageable. Sore throat, cough (loose phlegm), temp of 99.2, headache, and fatigue. My Dad is a pharmacist and he told me to start taking mucinex right away. So I am taking that and taking tylenol for the headache. I've been regularly checking my pulse ox and it's been staying around 97-99. Just thought I would send you a message in case there is anything else I should be doing. Thank you!    Ivania Root

## 2022-08-18 NOTE — PROGRESS NOTES
tablet 0     No current facility-administered medications for this visit. No Known Allergies    Health Maintenance   Topic Date Due    Diabetes screen  Never done    Flu vaccine (1) 09/01/2022    Depression Monitoring  08/18/2023    Colorectal Cancer Screen  12/30/2024    Cervical cancer screen  03/13/2025    Lipids  11/24/2026    DTaP/Tdap/Td vaccine (3 - Td or Tdap) 11/23/2031    COVID-19 Vaccine  Completed    Hepatitis C screen  Completed    HIV screen  Completed    Hepatitis A vaccine  Aged Out    Hepatitis B vaccine  Aged Out    Hib vaccine  Aged Out    Meningococcal (ACWY) vaccine  Aged Out    Pneumococcal 0-64 years Vaccine  Aged Out         Review of Systems   Constitutional:  Positive for chills, fatigue and fever (99.2). Negative for diaphoresis. HENT:  Positive for congestion and sore throat. Respiratory:  Positive for cough. Cardiovascular:  Negative for chest pain. Gastrointestinal:  Negative for abdominal pain, anorexia, change in bowel habit, nausea and vomiting. Musculoskeletal:  Negative for arthralgias, myalgias and neck pain. Skin:  Negative for rash. Neurological:  Positive for headaches. Negative for weakness.      Objective:     Patient-Reported Vitals 8/18/2022   Patient-Reported Weight 140   Patient-Reported Height 5'   Patient-Reported Systolic 348   Patient-Reported Diastolic 80   Patient-Reported Pulse 78   Patient-Reported Temperature 98.7   Patient-Reported SpO2 99          Physical Exam      PHYSICAL EXAMINATION:  [ INSTRUCTIONS:  \"[x]\" Indicates a positive item  \"[]\" Indicates a negative item  -- DELETE ALL ITEMS NOT EXAMINED]  Vital Signs: (As obtained by patient/caregiver or practitioner observation)  Patient-Reported Vitals 8/18/2022   Patient-Reported Weight 140   Patient-Reported Height 5'   Patient-Reported Systolic 965   Patient-Reported Diastolic 80   Patient-Reported Pulse 78   Patient-Reported Temperature 98.7   Patient-Reported SpO2 99 Constitutional: [x] Appears well-developed and well-nourished [x] No apparent distress      [] Abnormal-   Mental status  [x] Alert and awake  [x] Oriented to person/place/time [x]Able to follow commands      Eyes:  EOM    [x]  Normal  [] Abnormal-  Sclera  [x]  Normal  [] Abnormal -         Discharge [x]  None visible  [] Abnormal -    HENT:   [x] Normocephalic, atraumatic. [] Abnormal   [x] Mouth/Throat: Mucous membranes are moist.     External Ears [x] Normal  [] Abnormal-     Neck: [x] No visualized mass     Pulmonary/Chest: [x] Respiratory effort normal.  [x] No visualized signs of difficulty breathing or respiratory distress        [] Abnormal-      Musculoskeletal:   [] Normal gait with no signs of ataxia         [x] Normal range of motion of neck        [] Abnormal-       Neurological:        [x] No Facial Asymmetry (Cranial nerve 7 motor function) (limited exam to video visit)          [] No gaze palsy        [] Abnormal-         Skin:        [x] No significant exanthematous lesions or discoloration noted on facial skin         [] Abnormal-            Psychiatric:       [x] Normal Affect [] No Hallucinations        [] Abnormal-     Other pertinent observable physical exam findings-         Multiple labs and other testing may have been ordered which may not be completely evident from the above note due to system interface incompatibilities. Patient given educational materials - see patientinstructions. Discussed use, benefit, and side effects of prescribed medications. All patient questions answered. Pt voiced understanding. Reviewed health maintenance. Instructed to continue current medications, diet andexercise. Patient agreed with treatment plan. Follow up as directed.      (Please note that portions of this note were completed with a voice-recognition program. Efforts were made to edit the dictation but occasionally words are mis-transcribed.)    Electronically signed by Kathia Heath MD on 8/18/2022

## 2022-09-24 LAB
ALBUMIN SERPL-MCNC: 4.6 G/DL
ALP BLD-CCNC: 52 U/L
ALP BLD-CCNC: 52 U/L
ALT SERPL-CCNC: 20 U/L
ANION GAP SERPL CALCULATED.3IONS-SCNC: 9 MMOL/L
AST SERPL-CCNC: 19 U/L
AVERAGE GLUCOSE: 108
BASOPHILS ABSOLUTE: NORMAL
BASOPHILS RELATIVE PERCENT: NORMAL
BILIRUB SERPL-MCNC: 0.5 MG/DL (ref 0.1–1.4)
BUN BLDV-MCNC: 18 MG/DL
CALCIUM SERPL-MCNC: 9.5 MG/DL
CHLORIDE BLD-SCNC: 105 MMOL/L
CHOLESTEROL, TOTAL: 190 MG/DL
CHOLESTEROL/HDL RATIO: 2.79
CO2: 24 MMOL/L
CREAT SERPL-MCNC: 0.9 MG/DL
EOSINOPHILS ABSOLUTE: NORMAL
EOSINOPHILS RELATIVE PERCENT: NORMAL
GFR CALCULATED: >60
GLUCOSE BLD-MCNC: 95 MG/DL
HBA1C MFR BLD: 5.4 %
HCT VFR BLD CALC: 41 % (ref 36–46)
HDLC SERPL-MCNC: 68 MG/DL (ref 35–70)
HEMOGLOBIN: 13.5 G/DL (ref 12–16)
LDL CHOLESTEROL CALCULATED: 113 MG/DL (ref 0–160)
LYMPHOCYTES ABSOLUTE: NORMAL
LYMPHOCYTES RELATIVE PERCENT: NORMAL
MCH RBC QN AUTO: 29.6 PG
MCHC RBC AUTO-ENTMCNC: 32.9 G/DL
MCV RBC AUTO: 89.9 FL
MONOCYTES ABSOLUTE: NORMAL
MONOCYTES RELATIVE PERCENT: NORMAL
NEUTROPHILS ABSOLUTE: NORMAL
NEUTROPHILS RELATIVE PERCENT: NORMAL
NONHDLC SERPL-MCNC: NORMAL MG/DL
PLATELET # BLD: 358 K/ΜL
PMV BLD AUTO: 9.4 FL
POTASSIUM SERPL-SCNC: 4 MMOL/L
RBC # BLD: 4.56 10^6/ΜL
SODIUM BLD-SCNC: 138 MMOL/L
TOTAL PROTEIN: 7.4
TRIGL SERPL-MCNC: 44 MG/DL
VITAMIN D 25-HYDROXY: 50.1
VITAMIN D2, 25 HYDROXY: NORMAL
VITAMIN D3,25 HYDROXY: NORMAL
VLDLC SERPL CALC-MCNC: 9 MG/DL
WBC # BLD: 6.5 10^3/ML

## 2022-09-29 DIAGNOSIS — F32.81 PMDD (PREMENSTRUAL DYSPHORIC DISORDER): ICD-10-CM

## 2022-09-29 DIAGNOSIS — H93.12 TINNITUS OF LEFT EAR: ICD-10-CM

## 2022-09-29 NOTE — TELEPHONE ENCOUNTER
Sotero Soriano is requesting a refill on the following medication(s):  Requested Prescriptions     Pending Prescriptions Disp Refills    FLUoxetine (PROZAC) 20 MG capsule [Pharmacy Med Name: fluoxetine 20 mg capsule] 30 capsule 5     Sig: TAKE ONE CAPSULE BY MOUTH ONCE DAILY    naproxen (NAPROSYN) 500 MG tablet [Pharmacy Med Name: naproxen 500 mg tablet] 60 tablet 5     Sig: TAKE ONE TABLET BY MOUTH TWICE DAILY WITH FOOD       Last Visit Date (If Applicable):  9/68/2155    Next Visit Date:    12/12/2022

## 2022-09-30 RX ORDER — NAPROXEN 500 MG/1
TABLET ORAL
Qty: 60 TABLET | Refills: 5 | Status: SHIPPED | OUTPATIENT
Start: 2022-09-30

## 2022-09-30 RX ORDER — FLUOXETINE HYDROCHLORIDE 20 MG/1
CAPSULE ORAL
Qty: 30 CAPSULE | Refills: 5 | Status: SHIPPED | OUTPATIENT
Start: 2022-09-30

## 2022-10-07 ENCOUNTER — PATIENT MESSAGE (OUTPATIENT)
Dept: FAMILY MEDICINE CLINIC | Age: 47
End: 2022-10-07

## 2022-10-07 DIAGNOSIS — E03.8 SUBCLINICAL HYPOTHYROIDISM: Primary | ICD-10-CM

## 2022-10-07 NOTE — TELEPHONE ENCOUNTER
From: Leo Marks  To: Dr. Knapp Bad: 10/7/2022 8:53 AM EDT  Subject: Health fair blood work    Dr. Katie Vasquez,  I attended a health fair at [facility] [place] and had blood work done. Attached are the results. There should be a total of 4 pages. Let me know if there is anything you see that is of concern. Thank you!   Lacie Krabbe

## 2022-11-15 ENCOUNTER — E-VISIT (OUTPATIENT)
Dept: FAMILY MEDICINE CLINIC | Age: 47
End: 2022-11-15
Payer: COMMERCIAL

## 2022-11-15 DIAGNOSIS — N30.00 ACUTE CYSTITIS WITHOUT HEMATURIA: Primary | ICD-10-CM

## 2022-11-15 PROCEDURE — 99421 OL DIG E/M SVC 5-10 MIN: CPT | Performed by: FAMILY MEDICINE

## 2022-11-15 RX ORDER — NITROFURANTOIN 25; 75 MG/1; MG/1
100 CAPSULE ORAL 2 TIMES DAILY
Qty: 14 CAPSULE | Refills: 0 | Status: SHIPPED | OUTPATIENT
Start: 2022-11-15 | End: 2022-11-22

## 2022-12-05 LAB
T4 FREE: 0.92 NG/DL (ref 0.78–2.19)
TSH SERPL DL<=0.05 MIU/L-ACNC: 9.04 MIU/ML (ref 0.49–4.67)

## 2022-12-12 ENCOUNTER — OFFICE VISIT (OUTPATIENT)
Dept: FAMILY MEDICINE CLINIC | Age: 47
End: 2022-12-12
Payer: COMMERCIAL

## 2022-12-12 VITALS
HEART RATE: 81 BPM | RESPIRATION RATE: 16 BRPM | OXYGEN SATURATION: 99 % | WEIGHT: 145.2 LBS | BODY MASS INDEX: 29.27 KG/M2 | HEIGHT: 59 IN | SYSTOLIC BLOOD PRESSURE: 122 MMHG | DIASTOLIC BLOOD PRESSURE: 70 MMHG

## 2022-12-12 DIAGNOSIS — Z23 NEED FOR INFLUENZA VACCINATION: ICD-10-CM

## 2022-12-12 DIAGNOSIS — E03.9 ACQUIRED HYPOTHYROIDISM: ICD-10-CM

## 2022-12-12 DIAGNOSIS — Z00.00 WELL ADULT EXAM: Primary | ICD-10-CM

## 2022-12-12 PROCEDURE — 99396 PREV VISIT EST AGE 40-64: CPT | Performed by: FAMILY MEDICINE

## 2022-12-12 PROCEDURE — 90674 CCIIV4 VAC NO PRSV 0.5 ML IM: CPT | Performed by: FAMILY MEDICINE

## 2022-12-12 PROCEDURE — 90471 IMMUNIZATION ADMIN: CPT | Performed by: FAMILY MEDICINE

## 2022-12-12 RX ORDER — LEVOTHYROXINE SODIUM 0.05 MG/1
50 TABLET ORAL DAILY
Qty: 90 TABLET | Refills: 0 | Status: SHIPPED | OUTPATIENT
Start: 2022-12-12

## 2022-12-12 SDOH — ECONOMIC STABILITY: FOOD INSECURITY: WITHIN THE PAST 12 MONTHS, YOU WORRIED THAT YOUR FOOD WOULD RUN OUT BEFORE YOU GOT MONEY TO BUY MORE.: NEVER TRUE

## 2022-12-12 SDOH — ECONOMIC STABILITY: FOOD INSECURITY: WITHIN THE PAST 12 MONTHS, THE FOOD YOU BOUGHT JUST DIDN'T LAST AND YOU DIDN'T HAVE MONEY TO GET MORE.: NEVER TRUE

## 2022-12-12 ASSESSMENT — PATIENT HEALTH QUESTIONNAIRE - PHQ9
SUM OF ALL RESPONSES TO PHQ QUESTIONS 1-9: 5
7. TROUBLE CONCENTRATING ON THINGS, SUCH AS READING THE NEWSPAPER OR WATCHING TELEVISION: 1
3. TROUBLE FALLING OR STAYING ASLEEP: 2
4. FEELING TIRED OR HAVING LITTLE ENERGY: 2
1. LITTLE INTEREST OR PLEASURE IN DOING THINGS: 0
8. MOVING OR SPEAKING SO SLOWLY THAT OTHER PEOPLE COULD HAVE NOTICED. OR THE OPPOSITE, BEING SO FIGETY OR RESTLESS THAT YOU HAVE BEEN MOVING AROUND A LOT MORE THAN USUAL: 0
9. THOUGHTS THAT YOU WOULD BE BETTER OFF DEAD, OR OF HURTING YOURSELF: 0
2. FEELING DOWN, DEPRESSED OR HOPELESS: 0
6. FEELING BAD ABOUT YOURSELF - OR THAT YOU ARE A FAILURE OR HAVE LET YOURSELF OR YOUR FAMILY DOWN: 0
SUM OF ALL RESPONSES TO PHQ QUESTIONS 1-9: 5
5. POOR APPETITE OR OVEREATING: 0
SUM OF ALL RESPONSES TO PHQ QUESTIONS 1-9: 5
10. IF YOU CHECKED OFF ANY PROBLEMS, HOW DIFFICULT HAVE THESE PROBLEMS MADE IT FOR YOU TO DO YOUR WORK, TAKE CARE OF THINGS AT HOME, OR GET ALONG WITH OTHER PEOPLE: 0
SUM OF ALL RESPONSES TO PHQ QUESTIONS 1-9: 5
SUM OF ALL RESPONSES TO PHQ9 QUESTIONS 1 & 2: 0

## 2022-12-12 ASSESSMENT — SOCIAL DETERMINANTS OF HEALTH (SDOH): HOW HARD IS IT FOR YOU TO PAY FOR THE VERY BASICS LIKE FOOD, HOUSING, MEDICAL CARE, AND HEATING?: NOT HARD AT ALL

## 2022-12-12 NOTE — PROGRESS NOTES
[address] [facility]  [address]  Dept: [phone]  Dept CCD:[phone]    Nelly Rivera is a 50 y.o. female who presents today for her medical conditions/complaints as notedbelow. Nelly Rivera is c/o of Annual Exam (Discuss thyroid. )      Assessment/Plan:     1. Well adult exam  2. Acquired hypothyroidism  -     levothyroxine (SYNTHROID) 50 MCG tablet; Take 1 tablet by mouth daily, Disp-90 tablet, R-0Normal  -     TSH; Future  -     T4, Free; Future  3. Need for influenza vaccination  -     Influenza, FLUCELVAX, (age 10 mo+), IM, Preservative Free, 0.5 mL    Reviewed wellness screening labs from 9/22. Also reviewed new dx of hypothyroidism-- labs ordered with the recheck hi-fi appropriate dosing in 2 to 3 months. Recommend up-to-date on COVID-vaccine. Other wellness topics up-to-date. Will be due for mammogram next year. Lab Results   Component Value Date    WBC 6.5 09/24/2022    HGB 13.5 09/24/2022    HCT 41.0 09/24/2022     09/24/2022    CHOL 190 09/24/2022    TRIG 44 09/24/2022    HDL 68 09/24/2022    ALT 20 09/24/2022    AST 19 09/24/2022     09/24/2022    K 4.0 09/24/2022     09/24/2022    CREATININE 0.90 09/24/2022    BUN 18 09/24/2022    CO2 24 09/24/2022    TSH 9.04 (H) 12/05/2022    LABA1C 5.4 09/24/2022       Return in 6 months (on 6/12/2023) for Medication recheck. Subjective:      HPI:     HPI    Does take a multivitamin-- 50+ no additional vit B    Has been fatigued, wonders is related to her thyroid. Sleeps pretty well overall. BP Readings from Last 3 Encounters:   12/12/22 122/70   01/24/22 126/86   11/22/21 110/72          (goal 120/80)    Wt Readings from Last 3 Encounters:   12/12/22 145 lb 3.2 oz (65.9 kg)   01/24/22 138 lb (62.6 kg)   11/22/21 137 lb (62.1 kg)        Past Medical History:   Diagnosis Date    Psoriasis     usually only on her forehead      History reviewed.  No pertinent surgical history. Family History   Problem Relation Age of Onset    High Blood Pressure Mother     Other Mother         \"fluttering heart rate\" - on meds    Coronary Art Dis Father         quad bypass age 36, double bypass several years ago    Diabetes Father     Hypothyroidism Maternal Grandmother     Diabetes Paternal Aunt         with multiple comorbidities    High Cholesterol Other     Heart Disease Other         pericarditis       Social History     Tobacco Use    Smoking status: Never    Smokeless tobacco: Never   Substance Use Topics    Alcohol use: No      Current Outpatient Medications   Medication Sig Dispense Refill    levothyroxine (SYNTHROID) 50 MCG tablet Take 1 tablet by mouth daily 90 tablet 0    FLUoxetine (PROZAC) 20 MG capsule TAKE ONE CAPSULE BY MOUTH ONCE DAILY 30 capsule 5    naproxen (NAPROSYN) 500 MG tablet TAKE ONE TABLET BY MOUTH TWICE DAILY WITH FOOD 60 tablet 5    guaiFENesin (MUCINEX) 600 MG extended release tablet Take 1,200 mg by mouth 2 times daily Taking prn      cetirizine (ZYRTEC) 10 MG tablet TAKE ONE TABLET BY MOUTH ONCE DAILY 90 tablet 3    ondansetron (ZOFRAN) 4 MG tablet Every 8 hours as needed for nausea (Patient not taking: Reported on 12/12/2022) 20 tablet 0     No current facility-administered medications for this visit.      No Known Allergies    Health Maintenance   Topic Date Due    COVID-19 Vaccine (4 - Booster for Pfizer series) 12/30/2021    Depression Monitoring  12/12/2023    Colorectal Cancer Screen  12/30/2024    Cervical cancer screen  03/13/2025    Lipids  09/24/2027    DTaP/Tdap/Td vaccine (3 - Td or Tdap) 11/23/2031    Flu vaccine  Completed    Hepatitis C screen  Completed    HIV screen  Completed    Hepatitis A vaccine  Aged Out    Hib vaccine  Aged Out    Meningococcal (ACWY) vaccine  Aged Out    Pneumococcal 0-64 years Vaccine  Aged Out         Review of Systems    Objective:     /70 (Site: Right Upper Arm, Position: Sitting, Cuff Size: Medium Adult)   Pulse 81   Resp 16   Ht 4' 10.86\" (1.495 m)   Wt 145 lb 3.2 oz (65.9 kg)   SpO2 99%   BMI 29.47 kg/m²     Physical Exam  Vitals and nursing note reviewed. Constitutional:       Appearance: Normal appearance. She is well-developed. HENT:      Head: Normocephalic and atraumatic. Right Ear: External ear normal.      Left Ear: External ear normal.   Eyes:      Extraocular Movements: Extraocular movements intact. Conjunctiva/sclera: Conjunctivae normal.   Neck:      Thyroid: No thyromegaly. Vascular: No JVD. Comments: No thyromegaly  Cardiovascular:      Rate and Rhythm: Normal rate and regular rhythm. Pulses: Normal pulses. Heart sounds: Normal heart sounds. No murmur heard. No friction rub. No gallop. Pulmonary:      Effort: Pulmonary effort is normal. No respiratory distress. Breath sounds: Normal breath sounds. Abdominal:      Palpations: Abdomen is soft. There is no mass. Tenderness: There is no abdominal tenderness. Musculoskeletal:      Cervical back: Normal range of motion and neck supple. No tenderness. Right lower leg: No edema. Left lower leg: No edema. Lymphadenopathy:      Cervical: No cervical adenopathy. Skin:     General: Skin is warm. Capillary Refill: Capillary refill takes less than 2 seconds. Neurological:      General: No focal deficit present. Mental Status: She is alert and oriented to person, place, and time. Psychiatric:         Mood and Affect: Mood normal.         Behavior: Behavior normal.         Thought Content: Thought content normal.         Judgment: Judgment normal.             Multiple labs and other testing may have been ordered which may not be completely evident from the above note due to system interface incompatibilities. Patient given educational materials - see patientinstructions. Discussed use, benefit, and side effects of prescribed medications.   All patient questions answered. Pt voiced understanding. Reviewed health maintenance. Instructed to continue current medications, diet andexercise. Patient agreed with treatment plan. Follow up as directed.      (Please note that portions of this note were completed with a voice-recognition program. Efforts were made to edit the dictation but occasionally words are mis-transcribed.)    Electronically signed by Brenda Munguia MD on 12/17/2022

## 2023-02-23 DIAGNOSIS — E03.9 ACQUIRED HYPOTHYROIDISM: ICD-10-CM

## 2023-02-23 LAB
T4 FREE: 1.2
TSH SERPL DL<=0.05 MIU/L-ACNC: 2.64 UIU/ML

## 2023-03-06 DIAGNOSIS — E03.9 ACQUIRED HYPOTHYROIDISM: ICD-10-CM

## 2023-03-07 RX ORDER — LEVOTHYROXINE SODIUM 0.05 MG/1
TABLET ORAL
Qty: 90 TABLET | Refills: 1 | Status: SHIPPED | OUTPATIENT
Start: 2023-03-07

## 2023-06-17 PROBLEM — F32.81 PMDD (PREMENSTRUAL DYSPHORIC DISORDER): Status: ACTIVE | Noted: 2023-06-17

## 2023-07-02 ENCOUNTER — PATIENT MESSAGE (OUTPATIENT)
Dept: FAMILY MEDICINE CLINIC | Age: 48
End: 2023-07-02

## 2023-07-02 DIAGNOSIS — R92.8 ABNORMAL MAMMOGRAM OF RIGHT BREAST: Primary | ICD-10-CM

## 2023-07-03 NOTE — TELEPHONE ENCOUNTER
From: Archana Loja  To: Dr. Kelly Lyons: 7/2/2023 5:13 PM EDT  Subject: Dane Flores,  I had a mammogram on Friday. I see the results posted on [facility] and it looks like they want to do an ultrasound as well. I am off of work this week and I'm hoping that I could maybe get this scheduled while I am off. Do I need to get an order from you for the ultrasound or what are the next steps? Thanks so much!   Archana Loja

## 2023-07-06 ENCOUNTER — PATIENT MESSAGE (OUTPATIENT)
Dept: FAMILY MEDICINE CLINIC | Age: 48
End: 2023-07-06

## 2023-07-06 NOTE — TELEPHONE ENCOUNTER
From: Nilda Diaz  To: Dr. Chaparro Plants: 7/6/2023 9:28 AM EDT  Subject: Ultrasound and Mammogram results    Dr. Mare Harmon afternoon I had the follow up mammogram and ultrasound of my right breast at [facility].  The ladies in the radiology department told me that the results would be sent to you this morning after they were prepared. As soon as you receive those reports, could you please give me a call, [phone]? As you can probably guess, I have a lot of questions and no answers. My  is a  and is gone during the week but he wants to be available for any possible upcoming tests, appointments, procedures, etc. However, until we have some questions answered after the ultrasound and mammogram reports, it's difficult to know how to plan his schedule for this next week and upcoming weeks. Thank you in advance for anything you can do to speed up the information from the tests yesterday and for giving us as much information as you can. I really appreciate it!   Nilda Diaz  [phone]

## 2023-07-07 ENCOUNTER — OFFICE VISIT (OUTPATIENT)
Dept: FAMILY MEDICINE CLINIC | Age: 48
End: 2023-07-07
Payer: COMMERCIAL

## 2023-07-07 VITALS
BODY MASS INDEX: 30.4 KG/M2 | HEART RATE: 87 BPM | OXYGEN SATURATION: 99 % | DIASTOLIC BLOOD PRESSURE: 82 MMHG | SYSTOLIC BLOOD PRESSURE: 136 MMHG | WEIGHT: 149.8 LBS

## 2023-07-07 DIAGNOSIS — D24.1 BREAST ADENOMA, RIGHT: Primary | ICD-10-CM

## 2023-07-07 PROCEDURE — 99213 OFFICE O/P EST LOW 20 MIN: CPT | Performed by: FAMILY MEDICINE

## 2023-08-20 DIAGNOSIS — J30.89 CHRONIC NONSEASONAL ALLERGIC RHINITIS DUE TO POLLEN: ICD-10-CM

## 2023-08-21 RX ORDER — CETIRIZINE HYDROCHLORIDE 10 MG/1
10 TABLET ORAL DAILY
Qty: 90 TABLET | Refills: 3 | Status: SHIPPED | OUTPATIENT
Start: 2023-08-21

## 2023-08-21 NOTE — TELEPHONE ENCOUNTER
Barak Canales is requesting a refill on the following medication(s):  Requested Prescriptions     Pending Prescriptions Disp Refills    cetirizine (ZYRTEC) 10 MG tablet 90 tablet 3     Sig: Take 1 tablet by mouth daily       Last Visit Date (If Applicable):  6/0/3385    Next Visit Date:    12/11/2023

## 2023-09-10 ENCOUNTER — PATIENT MESSAGE (OUTPATIENT)
Dept: FAMILY MEDICINE CLINIC | Age: 48
End: 2023-09-10

## 2023-09-10 DIAGNOSIS — L40.9 PSORIASIS: Primary | ICD-10-CM

## 2023-09-11 NOTE — TELEPHONE ENCOUNTER
From: Cristian Ferris  To: Dr. Dawson Grand: 9/10/2023 2:03 PM EDT  Subject: Dermatologist Bruce Aguilar,   I would like to see a dermatologist and I was wondering if you would give me a referral to see Johanna Mathew at [facility] [address] [facility] the [facility] dermatology in [facility]? I have a flare up of Psoriasis that doesn't seem to be getting better. I also have several spots that I would like a dermatologist to take a look at since my Mom and her sisters have now all had precancerous and cancerous spots removed. Thank you!   Cristian Ferris

## 2023-09-14 DIAGNOSIS — E03.9 ACQUIRED HYPOTHYROIDISM: ICD-10-CM

## 2023-09-14 DIAGNOSIS — H93.12 TINNITUS OF LEFT EAR: ICD-10-CM

## 2023-09-14 DIAGNOSIS — F32.81 PMDD (PREMENSTRUAL DYSPHORIC DISORDER): ICD-10-CM

## 2023-09-14 RX ORDER — LEVOTHYROXINE SODIUM 0.05 MG/1
TABLET ORAL
Qty: 90 TABLET | Refills: 1 | Status: SHIPPED | OUTPATIENT
Start: 2023-09-14

## 2023-09-14 RX ORDER — FLUOXETINE HYDROCHLORIDE 20 MG/1
CAPSULE ORAL
Qty: 30 CAPSULE | Refills: 1 | Status: SHIPPED | OUTPATIENT
Start: 2023-09-14

## 2023-09-14 RX ORDER — NAPROXEN 500 MG/1
TABLET ORAL
Qty: 60 TABLET | Refills: 1 | Status: SHIPPED | OUTPATIENT
Start: 2023-09-14

## 2023-09-14 NOTE — TELEPHONE ENCOUNTER
Karin Oropeza is requesting a refill on the following medication(s):  Requested Prescriptions     Pending Prescriptions Disp Refills    levothyroxine (SYNTHROID) 50 MCG tablet [Pharmacy Med Name: levothyroxine 50 mcg tablet] 90 tablet 1     Sig: TAKE ONE TABLET BY MOUTH ONCE DAILY    FLUoxetine (PROZAC) 20 MG capsule [Pharmacy Med Name: fluoxetine 20 mg capsule] 30 capsule 1     Sig: TAKE ONE CAPSULE BY MOUTH ONCE DAILY    naproxen (NAPROSYN) 500 MG tablet [Pharmacy Med Name: naproxen 500 mg tablet] 60 tablet 1     Sig: TAKE ONE TABLET BY MOUTH TWICE DAILY WITH FOOD       Last Visit Date (If Applicable):  5/6/9942    Next Visit Date:    12/11/2023

## 2023-09-20 LAB — TSH SERPL DL<=0.05 MIU/L-ACNC: 2.72 UIU/ML

## 2023-09-21 DIAGNOSIS — E03.9 ACQUIRED HYPOTHYROIDISM: ICD-10-CM

## 2023-09-21 LAB — T4 FREE: 1.28

## 2023-11-28 DIAGNOSIS — F32.81 PMDD (PREMENSTRUAL DYSPHORIC DISORDER): ICD-10-CM

## 2023-11-28 NOTE — TELEPHONE ENCOUNTER
Kimberli Friedman is requesting a refill on the following medication(s):  Requested Prescriptions     Pending Prescriptions Disp Refills    FLUoxetine (PROZAC) 20 MG capsule [Pharmacy Med Name: fluoxetine 20 mg capsule] 30 capsule 3     Sig: TAKE ONE CAPSULE BY MOUTH ONCE DAILY       Last Visit Date (If Applicable):  7/3/5552    Next Visit Date:    12/15/2023

## 2023-11-30 RX ORDER — FLUOXETINE HYDROCHLORIDE 20 MG/1
CAPSULE ORAL
Qty: 30 CAPSULE | Refills: 3 | Status: SHIPPED | OUTPATIENT
Start: 2023-11-30

## 2023-12-01 DIAGNOSIS — F32.81 PMDD (PREMENSTRUAL DYSPHORIC DISORDER): ICD-10-CM

## 2023-12-04 RX ORDER — FLUOXETINE HYDROCHLORIDE 20 MG/1
CAPSULE ORAL
Qty: 30 CAPSULE | Refills: 3 | Status: SHIPPED | OUTPATIENT
Start: 2023-12-04

## 2023-12-04 NOTE — TELEPHONE ENCOUNTER
Trevor Rodriguez is requesting a refill on the following medication(s):  Requested Prescriptions     Pending Prescriptions Disp Refills    FLUoxetine (PROZAC) 20 MG capsule [Pharmacy Med Name: fluoxetine 20 mg capsule] 30 capsule 3     Sig: TAKE ONE CAPSULE BY MOUTH ONCE DAILY       Last Visit Date (If Applicable):  4/7/3245    Next Visit Date:    12/15/2023

## 2023-12-15 ENCOUNTER — OFFICE VISIT (OUTPATIENT)
Dept: FAMILY MEDICINE CLINIC | Age: 48
End: 2023-12-15
Payer: COMMERCIAL

## 2023-12-15 VITALS
DIASTOLIC BLOOD PRESSURE: 80 MMHG | WEIGHT: 149.6 LBS | SYSTOLIC BLOOD PRESSURE: 112 MMHG | OXYGEN SATURATION: 99 % | BODY MASS INDEX: 30.36 KG/M2 | HEART RATE: 103 BPM

## 2023-12-15 DIAGNOSIS — Z98.890 POSTOPERATIVE SCAR: ICD-10-CM

## 2023-12-15 DIAGNOSIS — L90.5 POSTOPERATIVE SCAR: ICD-10-CM

## 2023-12-15 DIAGNOSIS — E03.9 ACQUIRED HYPOTHYROIDISM: ICD-10-CM

## 2023-12-15 DIAGNOSIS — F41.8 ANXIETY ABOUT HEALTH: Primary | ICD-10-CM

## 2023-12-15 PROCEDURE — 99215 OFFICE O/P EST HI 40 MIN: CPT | Performed by: FAMILY MEDICINE

## 2023-12-15 RX ORDER — CHOLECALCIFEROL (VITAMIN D3) 1250 MCG
CAPSULE ORAL
COMMUNITY

## 2023-12-15 RX ORDER — NAPROXEN 500 MG/1
500 TABLET ORAL 2 TIMES DAILY WITH MEALS
Qty: 60 TABLET | Refills: 5 | Status: SHIPPED | OUTPATIENT
Start: 2023-12-15

## 2023-12-15 RX ORDER — ASCORBIC ACID 500 MG
TABLET ORAL
COMMUNITY

## 2023-12-15 RX ORDER — CALCIUM POLYCARBOPHIL 625 MG 625 MG/1
2 TABLET ORAL EVERY OTHER DAY
COMMUNITY

## 2023-12-15 RX ORDER — MULTIVIT WITH MINERALS/LUTEIN
1000 TABLET ORAL DAILY
COMMUNITY

## 2023-12-15 RX ORDER — ASCORBATE CALCIUM 500 MG
TABLET ORAL
COMMUNITY

## 2023-12-15 RX ORDER — LEVOTHYROXINE SODIUM 0.05 MG/1
50 TABLET ORAL DAILY
Qty: 90 TABLET | Refills: 2 | Status: SHIPPED | OUTPATIENT
Start: 2023-12-15

## 2023-12-15 RX ORDER — ROFLUMILAST 3 MG/G
CREAM TOPICAL
COMMUNITY
Start: 2023-12-07

## 2024-06-12 DIAGNOSIS — E03.9 ACQUIRED HYPOTHYROIDISM: ICD-10-CM

## 2024-06-12 DIAGNOSIS — J30.89 CHRONIC NONSEASONAL ALLERGIC RHINITIS DUE TO POLLEN: ICD-10-CM

## 2024-06-12 NOTE — TELEPHONE ENCOUNTER
Vanda Strauss is requesting a refill on the following medication(s):  Requested Prescriptions     Pending Prescriptions Disp Refills    levothyroxine (SYNTHROID) 50 MCG tablet [Pharmacy Med Name: levothyroxine 50 mcg tablet] 90 tablet 3     Sig: TAKE ONE TABLET BY MOUTH ONCE DAILY    cetirizine (ZYRTEC) 10 MG tablet [Pharmacy Med Name: cetirizine 10 mg tablet] 90 tablet 3     Sig: TAKE ONE TABLET BY MOUTH ONCE DAILY       Last Visit Date (If Applicable):  12/15/2023    Next Visit Date:    7/5/2024

## 2024-06-13 RX ORDER — LEVOTHYROXINE SODIUM 0.05 MG/1
50 TABLET ORAL DAILY
Qty: 90 TABLET | Refills: 3 | Status: SHIPPED | OUTPATIENT
Start: 2024-06-13

## 2024-06-13 RX ORDER — CETIRIZINE HYDROCHLORIDE 10 MG/1
10 TABLET ORAL DAILY
Qty: 90 TABLET | Refills: 3 | Status: SHIPPED | OUTPATIENT
Start: 2024-06-13

## 2024-07-01 ENCOUNTER — OFFICE VISIT (OUTPATIENT)
Dept: FAMILY MEDICINE CLINIC | Age: 49
End: 2024-07-01
Payer: COMMERCIAL

## 2024-07-01 VITALS
OXYGEN SATURATION: 97 % | SYSTOLIC BLOOD PRESSURE: 134 MMHG | DIASTOLIC BLOOD PRESSURE: 88 MMHG | TEMPERATURE: 100.1 F | HEART RATE: 105 BPM | WEIGHT: 152 LBS | BODY MASS INDEX: 30.64 KG/M2 | HEIGHT: 59 IN

## 2024-07-01 DIAGNOSIS — R45.89 ANXIETY ABOUT HEALTH: ICD-10-CM

## 2024-07-01 DIAGNOSIS — J40 BRONCHITIS: Primary | ICD-10-CM

## 2024-07-01 PROCEDURE — 99214 OFFICE O/P EST MOD 30 MIN: CPT | Performed by: FAMILY MEDICINE

## 2024-07-01 RX ORDER — AMOXICILLIN AND CLAVULANATE POTASSIUM 875; 125 MG/1; MG/1
1 TABLET, FILM COATED ORAL 2 TIMES DAILY
Qty: 14 TABLET | Refills: 0 | Status: SHIPPED | OUTPATIENT
Start: 2024-07-01 | End: 2024-07-08

## 2024-07-01 RX ORDER — BENZONATATE 200 MG/1
200 CAPSULE ORAL 3 TIMES DAILY PRN
Qty: 30 CAPSULE | Refills: 0 | Status: SHIPPED | OUTPATIENT
Start: 2024-07-01 | End: 2024-07-08

## 2024-07-01 RX ORDER — ALBUTEROL SULFATE 90 UG/1
2 AEROSOL, METERED RESPIRATORY (INHALATION) EVERY 4 HOURS PRN
Qty: 18 G | Refills: 5 | Status: SHIPPED | OUTPATIENT
Start: 2024-07-01

## 2024-07-01 SDOH — ECONOMIC STABILITY: FOOD INSECURITY: WITHIN THE PAST 12 MONTHS, THE FOOD YOU BOUGHT JUST DIDN'T LAST AND YOU DIDN'T HAVE MONEY TO GET MORE.: NEVER TRUE

## 2024-07-01 SDOH — ECONOMIC STABILITY: FOOD INSECURITY: WITHIN THE PAST 12 MONTHS, YOU WORRIED THAT YOUR FOOD WOULD RUN OUT BEFORE YOU GOT MONEY TO BUY MORE.: NEVER TRUE

## 2024-07-01 SDOH — ECONOMIC STABILITY: HOUSING INSECURITY
IN THE LAST 12 MONTHS, WAS THERE A TIME WHEN YOU DID NOT HAVE A STEADY PLACE TO SLEEP OR SLEPT IN A SHELTER (INCLUDING NOW)?: NO

## 2024-07-01 SDOH — ECONOMIC STABILITY: TRANSPORTATION INSECURITY
IN THE PAST 12 MONTHS, HAS LACK OF TRANSPORTATION KEPT YOU FROM MEETINGS, WORK, OR FROM GETTING THINGS NEEDED FOR DAILY LIVING?: NO

## 2024-07-01 SDOH — ECONOMIC STABILITY: INCOME INSECURITY: HOW HARD IS IT FOR YOU TO PAY FOR THE VERY BASICS LIKE FOOD, HOUSING, MEDICAL CARE, AND HEATING?: NOT HARD AT ALL

## 2024-07-01 ASSESSMENT — PATIENT HEALTH QUESTIONNAIRE - PHQ9
6. FEELING BAD ABOUT YOURSELF - OR THAT YOU ARE A FAILURE OR HAVE LET YOURSELF OR YOUR FAMILY DOWN: NOT AT ALL
3. TROUBLE FALLING OR STAYING ASLEEP: NOT AT ALL
SUM OF ALL RESPONSES TO PHQ9 QUESTIONS 1 & 2: 0
4. FEELING TIRED OR HAVING LITTLE ENERGY: SEVERAL DAYS
5. POOR APPETITE OR OVEREATING: NOT AT ALL
10. IF YOU CHECKED OFF ANY PROBLEMS, HOW DIFFICULT HAVE THESE PROBLEMS MADE IT FOR YOU TO DO YOUR WORK, TAKE CARE OF THINGS AT HOME, OR GET ALONG WITH OTHER PEOPLE: NOT DIFFICULT AT ALL
SUM OF ALL RESPONSES TO PHQ QUESTIONS 1-9: 1
9. THOUGHTS THAT YOU WOULD BE BETTER OFF DEAD, OR OF HURTING YOURSELF: NOT AT ALL
SUM OF ALL RESPONSES TO PHQ QUESTIONS 1-9: 1
SUM OF ALL RESPONSES TO PHQ QUESTIONS 1-9: 1
7. TROUBLE CONCENTRATING ON THINGS, SUCH AS READING THE NEWSPAPER OR WATCHING TELEVISION: NOT AT ALL
2. FEELING DOWN, DEPRESSED OR HOPELESS: NOT AT ALL
10. IF YOU CHECKED OFF ANY PROBLEMS, HOW DIFFICULT HAVE THESE PROBLEMS MADE IT FOR YOU TO DO YOUR WORK, TAKE CARE OF THINGS AT HOME, OR GET ALONG WITH OTHER PEOPLE: NOT DIFFICULT AT ALL
9. THOUGHTS THAT YOU WOULD BE BETTER OFF DEAD, OR OF HURTING YOURSELF: NOT AT ALL
8. MOVING OR SPEAKING SO SLOWLY THAT OTHER PEOPLE COULD HAVE NOTICED. OR THE OPPOSITE, BEING SO FIGETY OR RESTLESS THAT YOU HAVE BEEN MOVING AROUND A LOT MORE THAN USUAL: NOT AT ALL
2. FEELING DOWN, DEPRESSED OR HOPELESS: NOT AT ALL
4. FEELING TIRED OR HAVING LITTLE ENERGY: SEVERAL DAYS
SUM OF ALL RESPONSES TO PHQ QUESTIONS 1-9: 1
5. POOR APPETITE OR OVEREATING: NOT AT ALL
SUM OF ALL RESPONSES TO PHQ QUESTIONS 1-9: 1
6. FEELING BAD ABOUT YOURSELF - OR THAT YOU ARE A FAILURE OR HAVE LET YOURSELF OR YOUR FAMILY DOWN: NOT AT ALL
1. LITTLE INTEREST OR PLEASURE IN DOING THINGS: NOT AT ALL
1. LITTLE INTEREST OR PLEASURE IN DOING THINGS: NOT AT ALL
8. MOVING OR SPEAKING SO SLOWLY THAT OTHER PEOPLE COULD HAVE NOTICED. OR THE OPPOSITE - BEING SO FIDGETY OR RESTLESS THAT YOU HAVE BEEN MOVING AROUND A LOT MORE THAN USUAL: NOT AT ALL
3. TROUBLE FALLING OR STAYING ASLEEP: NOT AT ALL
7. TROUBLE CONCENTRATING ON THINGS, SUCH AS READING THE NEWSPAPER OR WATCHING TELEVISION: NOT AT ALL

## 2024-07-01 NOTE — PROGRESS NOTES
sx have been well controlled.     Doing infrared light treatments on her breast scar and has really helped with the appearance and her mental status.     Cough (Started 6/27. Cough is keeping her awake at night. Feels rattle in the chest. Ribs sore. Deep breath makes her cough. Throat dry and raw from coughing. Losing voice the past couple days. Took an at home covid test 6/28/24 and it was negative.  Temp ranging 98.1-100.3) - Is getting up some sputum, some thick sputum. Last meds were last night for fever.  HAs used the mucinex DM. Sleeping in the recliner to sit upright. No body aches, mild ST.  Ribs sore.  Minial HA.  No runny nose.  Hoarse.no ill contacts that she is aware of.  NO GI sx at all.      BP Readings from Last 3 Encounters:   07/01/24 134/88   12/15/23 112/80   07/07/23 136/82          (goal 120/80)    Wt Readings from Last 3 Encounters:   07/01/24 68.9 kg (152 lb)   12/15/23 67.9 kg (149 lb 9.6 oz)   07/07/23 67.9 kg (149 lb 12.8 oz)        Past Medical History:   Diagnosis Date    Chronic back pain     Regular massage appointments are helping    Headache     Occasionally    Psoriasis     usually only on her forehead      Past Surgical History:   Procedure Laterality Date    US GUIDED NEEDLE LOC OF RIGHT BREAST Right 8/16/2023    US GUIDED NEEDLE LOC OF RIGHT BREAST       Family History   Problem Relation Age of Onset    High Blood Pressure Mother     Other Mother         \"fluttering heart rate\" - on meds    Osteoporosis Mother     Vision Loss Mother         Macular degeneration    Coronary Art Dis Father         quad bypass age 40, double bypass several years ago    Diabetes Father     Heart Attack Father     Hypothyroidism Maternal Grandmother     Vision Loss Maternal Grandmother         Macular degeneration    Diabetes Paternal Aunt         with multiple comorbidities    High Cholesterol Other     Heart Disease Other         pericarditis    Coronary Art Dis Maternal Grandfather     Heart Attack

## 2024-07-03 DIAGNOSIS — J40 BRONCHITIS: ICD-10-CM

## 2024-07-10 ENCOUNTER — PATIENT MESSAGE (OUTPATIENT)
Dept: FAMILY MEDICINE CLINIC | Age: 49
End: 2024-07-10

## 2024-07-10 DIAGNOSIS — J40 BRONCHITIS: ICD-10-CM

## 2024-07-10 RX ORDER — BENZONATATE 200 MG/1
200 CAPSULE ORAL 3 TIMES DAILY PRN
Qty: 30 CAPSULE | Refills: 0 | Status: SHIPPED | OUTPATIENT
Start: 2024-07-10 | End: 2024-07-17

## 2024-07-10 NOTE — TELEPHONE ENCOUNTER
From: Vanda Strauss  To: Dr. Lavonne Faria  Sent: 7/10/2024 6:48 AM EDT  Subject: Tessalon refill     Dr. Faria,     Could I please get a refill on the tessalon prescription? Since Saturday I've been feeling improvement every day. This morning I feel better than I have in almost 2 weeks. I completed the antibiotics on Sunday. Still have a good productive cough and feel that the tessalon helps to keep it from being uncontrollable. The inhaler has been very helpful! My pulse ox has been around 97-98 the last several days. I'm no longer taking any tylenol and my temp is staying normal.     Thanks so much for everything!   Vanda Strauss

## 2024-07-11 ENCOUNTER — PATIENT MESSAGE (OUTPATIENT)
Dept: FAMILY MEDICINE CLINIC | Age: 49
End: 2024-07-11

## 2024-07-11 NOTE — TELEPHONE ENCOUNTER
From: Vanda Strauss  To: Dr. Lavonne Faria  Sent: 7/11/2024 8:38 AM EDT  Subject: Rash from Pneumonia?     Dr. Faria,    This morning I woke up with a rash on my thighs. I have attached 2 pictures. It doesn't seem to be spreading. It doesn't itch and it's not hot to the touch. It's only on the front side of my thighs between my knees and very top of my legs. Could this be from having Pneumonia? Is there anything I should do other than keep an eye on it?    Thank you again for everything!   Vanda Strauss

## 2024-08-22 ENCOUNTER — E-VISIT (OUTPATIENT)
Dept: FAMILY MEDICINE CLINIC | Age: 49
End: 2024-08-22
Payer: COMMERCIAL

## 2024-08-22 DIAGNOSIS — N30.00 ACUTE CYSTITIS WITHOUT HEMATURIA: Primary | ICD-10-CM

## 2024-08-22 PROCEDURE — 99421 OL DIG E/M SVC 5-10 MIN: CPT | Performed by: FAMILY MEDICINE

## 2024-08-22 RX ORDER — NITROFURANTOIN 25; 75 MG/1; MG/1
100 CAPSULE ORAL 2 TIMES DAILY
Qty: 20 CAPSULE | Refills: 0 | Status: SHIPPED | OUTPATIENT
Start: 2024-08-22 | End: 2024-09-01

## 2024-09-09 ENCOUNTER — PATIENT MESSAGE (OUTPATIENT)
Dept: FAMILY MEDICINE CLINIC | Age: 49
End: 2024-09-09

## 2024-09-09 DIAGNOSIS — M79.671 RIGHT FOOT PAIN: Primary | ICD-10-CM

## 2024-09-10 DIAGNOSIS — M79.671 RIGHT FOOT PAIN: ICD-10-CM

## 2024-09-11 ENCOUNTER — OFFICE VISIT (OUTPATIENT)
Dept: FAMILY MEDICINE CLINIC | Age: 49
End: 2024-09-11
Payer: COMMERCIAL

## 2024-09-11 VITALS
HEART RATE: 84 BPM | SYSTOLIC BLOOD PRESSURE: 130 MMHG | BODY MASS INDEX: 31.31 KG/M2 | OXYGEN SATURATION: 99 % | WEIGHT: 155 LBS | DIASTOLIC BLOOD PRESSURE: 80 MMHG

## 2024-09-11 DIAGNOSIS — S96.911A STRAIN OF RIGHT FOOT, INITIAL ENCOUNTER: Primary | ICD-10-CM

## 2024-09-11 PROCEDURE — 99213 OFFICE O/P EST LOW 20 MIN: CPT | Performed by: FAMILY MEDICINE

## 2024-09-11 ASSESSMENT — ENCOUNTER SYMPTOMS: SHORTNESS OF BREATH: 0

## 2024-09-16 DIAGNOSIS — F32.81 PMDD (PREMENSTRUAL DYSPHORIC DISORDER): ICD-10-CM

## 2025-01-02 DIAGNOSIS — F32.81 PMDD (PREMENSTRUAL DYSPHORIC DISORDER): ICD-10-CM

## 2025-01-02 NOTE — TELEPHONE ENCOUNTER
Vanda Strauss is requesting a refill on the following medication(s):  Requested Prescriptions     Pending Prescriptions Disp Refills    FLUoxetine (PROZAC) 20 MG capsule [Pharmacy Med Name: fluoxetine 20 mg capsule] 30 capsule 3     Sig: TAKE ONE CAPSULE BY MOUTH ONCE DAILY       Last Visit Date (If Applicable):  9/11/2024    Next Visit Date:    3/14/2025

## 2025-02-10 ENCOUNTER — PATIENT MESSAGE (OUTPATIENT)
Dept: FAMILY MEDICINE CLINIC | Age: 50
End: 2025-02-10

## 2025-02-10 DIAGNOSIS — F32.81 PMDD (PREMENSTRUAL DYSPHORIC DISORDER): Primary | ICD-10-CM

## 2025-02-11 RX ORDER — FLUOXETINE 10 MG/1
10 CAPSULE ORAL
Qty: 30 CAPSULE | Refills: 3 | Status: SHIPPED | OUTPATIENT
Start: 2025-02-11

## 2025-03-14 ENCOUNTER — OFFICE VISIT (OUTPATIENT)
Dept: FAMILY MEDICINE CLINIC | Age: 50
End: 2025-03-14

## 2025-03-14 VITALS
BODY MASS INDEX: 32.32 KG/M2 | WEIGHT: 160 LBS | SYSTOLIC BLOOD PRESSURE: 122 MMHG | OXYGEN SATURATION: 99 % | DIASTOLIC BLOOD PRESSURE: 70 MMHG | HEART RATE: 88 BPM

## 2025-03-14 DIAGNOSIS — L40.9 PSORIASIS: ICD-10-CM

## 2025-03-14 DIAGNOSIS — F32.81 PMDD (PREMENSTRUAL DYSPHORIC DISORDER): ICD-10-CM

## 2025-03-14 DIAGNOSIS — Z13.220 SCREENING, LIPID: ICD-10-CM

## 2025-03-14 DIAGNOSIS — Z82.49 FAMILY HISTORY OF EARLY CAD: ICD-10-CM

## 2025-03-14 DIAGNOSIS — Z12.11 SCREEN FOR COLON CANCER: ICD-10-CM

## 2025-03-14 DIAGNOSIS — E03.9 ACQUIRED HYPOTHYROIDISM: Primary | ICD-10-CM

## 2025-03-14 SDOH — ECONOMIC STABILITY: FOOD INSECURITY: WITHIN THE PAST 12 MONTHS, YOU WORRIED THAT YOUR FOOD WOULD RUN OUT BEFORE YOU GOT MONEY TO BUY MORE.: NEVER TRUE

## 2025-03-14 SDOH — ECONOMIC STABILITY: FOOD INSECURITY: WITHIN THE PAST 12 MONTHS, THE FOOD YOU BOUGHT JUST DIDN'T LAST AND YOU DIDN'T HAVE MONEY TO GET MORE.: NEVER TRUE

## 2025-03-14 SDOH — ECONOMIC STABILITY: TRANSPORTATION INSECURITY
IN THE PAST 12 MONTHS, HAS THE LACK OF TRANSPORTATION KEPT YOU FROM MEDICAL APPOINTMENTS OR FROM GETTING MEDICATIONS?: NO

## 2025-03-14 SDOH — ECONOMIC STABILITY: INCOME INSECURITY: IN THE LAST 12 MONTHS, WAS THERE A TIME WHEN YOU WERE NOT ABLE TO PAY THE MORTGAGE OR RENT ON TIME?: NO

## 2025-03-14 ASSESSMENT — PATIENT HEALTH QUESTIONNAIRE - PHQ9
SUM OF ALL RESPONSES TO PHQ QUESTIONS 1-9: 1
4. FEELING TIRED OR HAVING LITTLE ENERGY: SEVERAL DAYS
8. MOVING OR SPEAKING SO SLOWLY THAT OTHER PEOPLE COULD HAVE NOTICED. OR THE OPPOSITE, BEING SO FIGETY OR RESTLESS THAT YOU HAVE BEEN MOVING AROUND A LOT MORE THAN USUAL: NOT AT ALL
7. TROUBLE CONCENTRATING ON THINGS, SUCH AS READING THE NEWSPAPER OR WATCHING TELEVISION: NOT AT ALL
1. LITTLE INTEREST OR PLEASURE IN DOING THINGS: NOT AT ALL
SUM OF ALL RESPONSES TO PHQ QUESTIONS 1-9: 1
5. POOR APPETITE OR OVEREATING: NOT AT ALL
SUM OF ALL RESPONSES TO PHQ QUESTIONS 1-9: 1
6. FEELING BAD ABOUT YOURSELF - OR THAT YOU ARE A FAILURE OR HAVE LET YOURSELF OR YOUR FAMILY DOWN: NOT AT ALL
10. IF YOU CHECKED OFF ANY PROBLEMS, HOW DIFFICULT HAVE THESE PROBLEMS MADE IT FOR YOU TO DO YOUR WORK, TAKE CARE OF THINGS AT HOME, OR GET ALONG WITH OTHER PEOPLE: NOT DIFFICULT AT ALL
SUM OF ALL RESPONSES TO PHQ QUESTIONS 1-9: 1
7. TROUBLE CONCENTRATING ON THINGS, SUCH AS READING THE NEWSPAPER OR WATCHING TELEVISION: NOT AT ALL
5. POOR APPETITE OR OVEREATING: NOT AT ALL
3. TROUBLE FALLING OR STAYING ASLEEP: NOT AT ALL
8. MOVING OR SPEAKING SO SLOWLY THAT OTHER PEOPLE COULD HAVE NOTICED. OR THE OPPOSITE - BEING SO FIDGETY OR RESTLESS THAT YOU HAVE BEEN MOVING AROUND A LOT MORE THAN USUAL: NOT AT ALL
10. IF YOU CHECKED OFF ANY PROBLEMS, HOW DIFFICULT HAVE THESE PROBLEMS MADE IT FOR YOU TO DO YOUR WORK, TAKE CARE OF THINGS AT HOME, OR GET ALONG WITH OTHER PEOPLE: NOT DIFFICULT AT ALL
2. FEELING DOWN, DEPRESSED OR HOPELESS: NOT AT ALL
6. FEELING BAD ABOUT YOURSELF - OR THAT YOU ARE A FAILURE OR HAVE LET YOURSELF OR YOUR FAMILY DOWN: NOT AT ALL
1. LITTLE INTEREST OR PLEASURE IN DOING THINGS: NOT AT ALL
4. FEELING TIRED OR HAVING LITTLE ENERGY: SEVERAL DAYS
SUM OF ALL RESPONSES TO PHQ QUESTIONS 1-9: 1
9. THOUGHTS THAT YOU WOULD BE BETTER OFF DEAD, OR OF HURTING YOURSELF: NOT AT ALL
2. FEELING DOWN, DEPRESSED OR HOPELESS: NOT AT ALL
9. THOUGHTS THAT YOU WOULD BE BETTER OFF DEAD, OR OF HURTING YOURSELF: NOT AT ALL
3. TROUBLE FALLING OR STAYING ASLEEP: NOT AT ALL

## 2025-03-14 ASSESSMENT — ENCOUNTER SYMPTOMS
SHORTNESS OF BREATH: 0
BACK PAIN: 1
VOMITING: 0
COUGH: 0
NAUSEA: 0
ABDOMINAL PAIN: 0

## 2025-03-14 NOTE — PROGRESS NOTES
[facility] - Outpatient Note      Subjective:    Vanda Strauss is a 49 y.o. female with  has a past medical history of Chronic back pain, Headache, and Psoriasis.    Presented to the office today for:  Chief Complaint   Patient presents with    Medication Check     Thyroid f/u. Pt states that last time she was here her Prozac and she has not had any issues with the changes.     Heart Problem     Pt states that her grandpa and her cousin both  of a heart attack and is wondering if there is any testing that she should be doing now. Pt denies any symptoms that she has noticed.     Weight Gain     Pt states that she has been noticing that her weight has been continuing to go up.     Skin Problem     Pt states that she has been seeing a dermatologist in [place] and they recommenced her seeing a rheumatologist.        Heart Problem  Pertinent negatives include no abdominal pain, arthralgias, chest pain, chills, coughing, fatigue, fever, headaches, nausea or vomiting.   Weight Gain  Pertinent negatives include no abdominal pain, arthralgias, chest pain, chills, coughing, fatigue, fever, headaches, nausea or vomiting.   Skin Problem  Pertinent negatives include no cough, fatigue, fever, shortness of breath or vomiting.     Patient is a 50 yo female with Pmhx of Hypothyroidism, and Psoriasis presentng to clinic for medication follow up. Patient at moment is doing overall well no acute symptoms, but has some concerns. Has concerns for unintentional weight gain and requesting recheck of thyroid. Denies any cold intolerance, hair loss or decreased energy but admits has been very sedentary this prior winter. Denies any chest pain, palpitations but admits has some significant family Hx of Cardiac Disease. Her father in 40s required bypass and recently had first cousin aged 61 have cardiac arrest and another one passed from MI/Cardiac Arrest. Patient has concerns but

## 2025-03-24 LAB
ALBUMIN: 4.5 G/DL
ALP BLD-CCNC: 56 U/L
ALT SERPL-CCNC: 22 U/L
ANION GAP SERPL CALCULATED.3IONS-SCNC: NORMAL MMOL/L
AST SERPL-CCNC: 21 U/L
BILIRUB SERPL-MCNC: 0.3 MG/DL (ref 0.1–1.4)
BUN BLDV-MCNC: 14 MG/DL
C-REACTIVE PROTEIN: 3
CALCIUM SERPL-MCNC: 9.3 MG/DL
CHLORIDE BLD-SCNC: 102 MMOL/L
CHOLESTEROL, TOTAL: 167 MG/DL
CHOLESTEROL/HDL RATIO: 2.8
CO2: 24 MMOL/L
CREAT SERPL-MCNC: 0.72 MG/DL
GFR, ESTIMATED: 101
GLUCOSE BLD-MCNC: 88 MG/DL
HDLC SERPL-MCNC: 59 MG/DL (ref 35–70)
LDL CHOLESTEROL: 92
NONHDLC SERPL-MCNC: 92 MG/DL
POTASSIUM SERPL-SCNC: 4.3 MMOL/L
SED RATE, AUTOMATED: 2
SODIUM BLD-SCNC: 139 MMOL/L
T4 FREE: 1.21
TOTAL PROTEIN: 6.6 G/DL (ref 6.4–8.2)
TRIGL SERPL-MCNC: 88 MG/DL
TSH SERPL DL<=0.05 MIU/L-ACNC: 3.11 UIU/ML
VLDLC SERPL CALC-MCNC: 16 MG/DL

## 2025-03-28 ENCOUNTER — RESULTS FOLLOW-UP (OUTPATIENT)
Dept: FAMILY MEDICINE CLINIC | Age: 50
End: 2025-03-28

## 2025-03-28 DIAGNOSIS — Z82.49 FAMILY HISTORY OF EARLY CAD: ICD-10-CM

## 2025-03-28 DIAGNOSIS — E03.9 ACQUIRED HYPOTHYROIDISM: ICD-10-CM

## 2025-03-28 DIAGNOSIS — L40.9 PSORIASIS: ICD-10-CM

## 2025-03-28 DIAGNOSIS — F32.81 PMDD (PREMENSTRUAL DYSPHORIC DISORDER): ICD-10-CM

## 2025-04-05 LAB — NONINV COLON CA DNA+OCC BLD SCRN STL QL: NEGATIVE

## 2025-05-15 DIAGNOSIS — E03.9 ACQUIRED HYPOTHYROIDISM: ICD-10-CM

## 2025-05-15 DIAGNOSIS — J30.89 CHRONIC NONSEASONAL ALLERGIC RHINITIS DUE TO POLLEN: ICD-10-CM

## 2025-05-15 RX ORDER — LEVOTHYROXINE SODIUM 50 UG/1
50 TABLET ORAL DAILY
Qty: 90 TABLET | Refills: 3 | Status: SHIPPED | OUTPATIENT
Start: 2025-05-15

## 2025-05-15 RX ORDER — CETIRIZINE HYDROCHLORIDE 10 MG/1
10 TABLET ORAL DAILY
Qty: 90 TABLET | Refills: 3 | Status: SHIPPED | OUTPATIENT
Start: 2025-05-15

## 2025-05-15 NOTE — TELEPHONE ENCOUNTER
Vanda Strauss is requesting a refill on the following medication(s):  Requested Prescriptions     Pending Prescriptions Disp Refills    levothyroxine (SYNTHROID) 50 MCG tablet [Pharmacy Med Name: levothyroxine 50 mcg tablet] 90 tablet 3     Sig: TAKE ONE TABLET BY MOUTH ONCE DAILY    cetirizine (ZYRTEC) 10 MG tablet [Pharmacy Med Name: cetirizine 10 mg tablet] 90 tablet 3     Sig: TAKE ONE TABLET BY MOUTH ONCE DAILY       Last Visit Date (If Applicable):  3/14/2025    Next Visit Date:    9/26/2025

## 2025-07-01 DIAGNOSIS — F32.81 PMDD (PREMENSTRUAL DYSPHORIC DISORDER): ICD-10-CM

## 2025-07-01 RX ORDER — FLUOXETINE 10 MG/1
10 CAPSULE ORAL
Qty: 30 CAPSULE | Refills: 3 | Status: SHIPPED | OUTPATIENT
Start: 2025-07-01

## 2025-07-01 NOTE — TELEPHONE ENCOUNTER
Vanda Strauss is requesting a refill on the following medication(s):  Requested Prescriptions     Pending Prescriptions Disp Refills    FLUoxetine (PROZAC) 10 MG capsule [Pharmacy Med Name: fluoxetine 10 mg capsule] 30 capsule 3     Sig: Take 1 capsule by mouth every 48 hours Alternating with the 20 mg dose       Last Visit Date (If Applicable):  3/14/2025      Next Visit Date:    9/26/2025

## 2025-07-30 DIAGNOSIS — F32.81 PMDD (PREMENSTRUAL DYSPHORIC DISORDER): ICD-10-CM

## 2025-07-30 NOTE — TELEPHONE ENCOUNTER
Vanda Strauss is requesting a refill on the following medication(s):  Requested Prescriptions     Pending Prescriptions Disp Refills    FLUoxetine (PROZAC) 20 MG capsule [Pharmacy Med Name: fluoxetine 20 mg capsule] 30 capsule 5     Sig: TAKE ONE CAPSULE BY MOUTH ONCE DAILY       Last Visit Date (If Applicable):  3/14/2025      Next Visit Date:    9/26/2025

## 2025-09-01 DIAGNOSIS — J40 BRONCHITIS: ICD-10-CM

## 2025-09-03 RX ORDER — ALBUTEROL SULFATE 90 UG/1
2 INHALANT RESPIRATORY (INHALATION) EVERY 4 HOURS PRN
Qty: 18 G | Refills: 5 | Status: SHIPPED | OUTPATIENT
Start: 2025-09-03